# Patient Record
Sex: MALE | Race: WHITE | Employment: OTHER | URBAN - METROPOLITAN AREA
[De-identification: names, ages, dates, MRNs, and addresses within clinical notes are randomized per-mention and may not be internally consistent; named-entity substitution may affect disease eponyms.]

---

## 2017-01-25 ENCOUNTER — OFFICE VISIT (OUTPATIENT)
Dept: FAMILY MEDICINE CLINIC | Age: 62
End: 2017-01-25

## 2017-01-25 VITALS
DIASTOLIC BLOOD PRESSURE: 84 MMHG | OXYGEN SATURATION: 96 % | WEIGHT: 188 LBS | HEART RATE: 65 BPM | SYSTOLIC BLOOD PRESSURE: 136 MMHG | BODY MASS INDEX: 25.46 KG/M2

## 2017-01-25 DIAGNOSIS — E78.5 HYPERLIPIDEMIA, UNSPECIFIED HYPERLIPIDEMIA TYPE: ICD-10-CM

## 2017-01-25 DIAGNOSIS — G56.02 CARPAL TUNNEL SYNDROME OF LEFT WRIST: Primary | ICD-10-CM

## 2017-01-25 DIAGNOSIS — I10 ESSENTIAL HYPERTENSION: ICD-10-CM

## 2017-01-25 PROCEDURE — 99214 OFFICE O/P EST MOD 30 MIN: CPT | Performed by: FAMILY MEDICINE

## 2017-01-25 RX ORDER — ATORVASTATIN CALCIUM 20 MG/1
20 TABLET, FILM COATED ORAL DAILY
COMMUNITY
Start: 2016-11-21 | End: 2022-06-22

## 2017-01-25 RX ORDER — LISINOPRIL 5 MG/1
5 TABLET ORAL DAILY
COMMUNITY
Start: 2017-01-09 | End: 2020-08-11

## 2017-02-15 ENCOUNTER — OFFICE VISIT (OUTPATIENT)
Dept: ORTHOPEDIC SURGERY | Age: 62
End: 2017-02-15

## 2017-02-15 VITALS — WEIGHT: 185 LBS | HEIGHT: 71 IN | BODY MASS INDEX: 25.9 KG/M2

## 2017-02-15 DIAGNOSIS — G56.02 CARPAL TUNNEL SYNDROME, LEFT: ICD-10-CM

## 2017-02-15 DIAGNOSIS — M25.532 WRIST PAIN, LEFT: Primary | ICD-10-CM

## 2017-02-15 PROCEDURE — 73110 X-RAY EXAM OF WRIST: CPT | Performed by: ORTHOPAEDIC SURGERY

## 2017-02-15 PROCEDURE — 99213 OFFICE O/P EST LOW 20 MIN: CPT | Performed by: ORTHOPAEDIC SURGERY

## 2017-03-27 ENCOUNTER — OFFICE VISIT (OUTPATIENT)
Dept: FAMILY MEDICINE CLINIC | Age: 62
End: 2017-03-27

## 2017-03-27 VITALS
WEIGHT: 174 LBS | SYSTOLIC BLOOD PRESSURE: 138 MMHG | DIASTOLIC BLOOD PRESSURE: 84 MMHG | TEMPERATURE: 98.8 F | HEART RATE: 68 BPM | BODY MASS INDEX: 24.27 KG/M2 | OXYGEN SATURATION: 95 %

## 2017-03-27 DIAGNOSIS — J40 BRONCHITIS: Primary | ICD-10-CM

## 2017-03-27 PROCEDURE — 99213 OFFICE O/P EST LOW 20 MIN: CPT | Performed by: NURSE PRACTITIONER

## 2017-03-27 RX ORDER — AMOXICILLIN AND CLAVULANATE POTASSIUM 875; 125 MG/1; MG/1
1 TABLET, FILM COATED ORAL 2 TIMES DAILY
Qty: 20 TABLET | Refills: 0 | Status: SHIPPED | OUTPATIENT
Start: 2017-03-27 | End: 2017-04-06 | Stop reason: ALTCHOICE

## 2017-03-27 ASSESSMENT — ENCOUNTER SYMPTOMS
COUGH: 1
WHEEZING: 1
DIARRHEA: 0
SHORTNESS OF BREATH: 1
VOMITING: 0
NAUSEA: 1
SPUTUM PRODUCTION: 1
SORE THROAT: 0

## 2017-04-06 ENCOUNTER — OFFICE VISIT (OUTPATIENT)
Dept: FAMILY MEDICINE CLINIC | Age: 62
End: 2017-04-06

## 2017-04-06 VITALS
OXYGEN SATURATION: 98 % | DIASTOLIC BLOOD PRESSURE: 84 MMHG | SYSTOLIC BLOOD PRESSURE: 122 MMHG | BODY MASS INDEX: 25.52 KG/M2 | HEART RATE: 58 BPM | WEIGHT: 183 LBS | RESPIRATION RATE: 16 BRPM

## 2017-04-06 DIAGNOSIS — R53.83 FATIGUE, UNSPECIFIED TYPE: ICD-10-CM

## 2017-04-06 DIAGNOSIS — R42 DIZZINESS: Primary | ICD-10-CM

## 2017-04-06 LAB
A/G RATIO: 1.4 (ref 1.1–2.2)
ALBUMIN SERPL-MCNC: 4 G/DL (ref 3.4–5)
ALP BLD-CCNC: 170 U/L (ref 40–129)
ALT SERPL-CCNC: 34 U/L (ref 10–40)
ANION GAP SERPL CALCULATED.3IONS-SCNC: 13 MMOL/L (ref 3–16)
AST SERPL-CCNC: 26 U/L (ref 15–37)
BASOPHILS ABSOLUTE: 0 K/UL (ref 0–0.2)
BASOPHILS RELATIVE PERCENT: 0.4 %
BILIRUB SERPL-MCNC: 0.3 MG/DL (ref 0–1)
BUN BLDV-MCNC: 17 MG/DL (ref 7–20)
CALCIUM SERPL-MCNC: 9 MG/DL (ref 8.3–10.6)
CHLORIDE BLD-SCNC: 103 MMOL/L (ref 99–110)
CO2: 23 MMOL/L (ref 21–32)
CREAT SERPL-MCNC: 0.9 MG/DL (ref 0.8–1.3)
EOSINOPHILS ABSOLUTE: 0.1 K/UL (ref 0–0.6)
EOSINOPHILS RELATIVE PERCENT: 1.7 %
GFR AFRICAN AMERICAN: >60
GFR NON-AFRICAN AMERICAN: >60
GLOBULIN: 2.9 G/DL
GLUCOSE BLD-MCNC: 93 MG/DL (ref 70–99)
HCT VFR BLD CALC: 41.6 % (ref 40.5–52.5)
HEMOGLOBIN: 13.8 G/DL (ref 13.5–17.5)
LYMPHOCYTES ABSOLUTE: 1.3 K/UL (ref 1–5.1)
LYMPHOCYTES RELATIVE PERCENT: 21.4 %
MCH RBC QN AUTO: 29.2 PG (ref 26–34)
MCHC RBC AUTO-ENTMCNC: 33.2 G/DL (ref 31–36)
MCV RBC AUTO: 87.9 FL (ref 80–100)
MONOCYTES ABSOLUTE: 0.6 K/UL (ref 0–1.3)
MONOCYTES RELATIVE PERCENT: 10.8 %
NEUTROPHILS ABSOLUTE: 3.9 K/UL (ref 1.7–7.7)
NEUTROPHILS RELATIVE PERCENT: 65.7 %
PDW BLD-RTO: 14.7 % (ref 12.4–15.4)
PLATELET # BLD: 308 K/UL (ref 135–450)
PMV BLD AUTO: 7.7 FL (ref 5–10.5)
POTASSIUM SERPL-SCNC: 4.6 MMOL/L (ref 3.5–5.1)
RBC # BLD: 4.73 M/UL (ref 4.2–5.9)
SODIUM BLD-SCNC: 139 MMOL/L (ref 136–145)
TOTAL PROTEIN: 6.9 G/DL (ref 6.4–8.2)
TSH REFLEX FT4: 0.91 UIU/ML (ref 0.27–4.2)
WBC # BLD: 5.9 K/UL (ref 4–11)

## 2017-04-06 PROCEDURE — 99213 OFFICE O/P EST LOW 20 MIN: CPT | Performed by: NURSE PRACTITIONER

## 2017-04-06 PROCEDURE — 36415 COLL VENOUS BLD VENIPUNCTURE: CPT | Performed by: NURSE PRACTITIONER

## 2017-04-06 ASSESSMENT — ENCOUNTER SYMPTOMS
SHORTNESS OF BREATH: 0
VOMITING: 0
NAUSEA: 0
DIARRHEA: 0

## 2017-07-31 ENCOUNTER — TELEPHONE (OUTPATIENT)
Dept: ORTHOPEDIC SURGERY | Age: 62
End: 2017-07-31

## 2017-08-14 ENCOUNTER — OFFICE VISIT (OUTPATIENT)
Dept: FAMILY MEDICINE CLINIC | Age: 62
End: 2017-08-14

## 2017-08-14 VITALS
SYSTOLIC BLOOD PRESSURE: 112 MMHG | BODY MASS INDEX: 25.76 KG/M2 | HEART RATE: 72 BPM | DIASTOLIC BLOOD PRESSURE: 80 MMHG | OXYGEN SATURATION: 97 % | WEIGHT: 184 LBS | TEMPERATURE: 98.8 F | HEIGHT: 71 IN

## 2017-08-14 DIAGNOSIS — Z01.818 PREOP EXAMINATION: Primary | ICD-10-CM

## 2017-08-14 DIAGNOSIS — Z01.818 PRE-OP TESTING: ICD-10-CM

## 2017-08-14 PROCEDURE — 99243 OFF/OP CNSLTJ NEW/EST LOW 30: CPT | Performed by: FAMILY MEDICINE

## 2017-08-14 PROCEDURE — 93000 ELECTROCARDIOGRAM COMPLETE: CPT | Performed by: FAMILY MEDICINE

## 2017-08-22 ENCOUNTER — HOSPITAL ENCOUNTER (OUTPATIENT)
Dept: SURGERY | Age: 62
Discharge: HOME | End: 2017-08-22
Attending: ORTHOPAEDIC SURGERY | Admitting: ORTHOPAEDIC SURGERY

## 2017-08-22 VITALS
OXYGEN SATURATION: 93 % | WEIGHT: 184 LBS | DIASTOLIC BLOOD PRESSURE: 89 MMHG | RESPIRATION RATE: 16 BRPM | BODY MASS INDEX: 25.76 KG/M2 | HEIGHT: 71 IN | SYSTOLIC BLOOD PRESSURE: 124 MMHG | HEART RATE: 52 BPM | TEMPERATURE: 98.2 F

## 2017-08-22 RX ORDER — OXYCODONE HYDROCHLORIDE AND ACETAMINOPHEN 5; 325 MG/1; MG/1
1 TABLET ORAL PRN
Status: ACTIVE | OUTPATIENT
Start: 2017-08-22 | End: 2017-08-22

## 2017-08-22 RX ORDER — ONDANSETRON 2 MG/ML
4 INJECTION INTRAMUSCULAR; INTRAVENOUS
Status: ACTIVE | OUTPATIENT
Start: 2017-08-22 | End: 2017-08-22

## 2017-08-22 RX ORDER — SODIUM CHLORIDE 0.9 % (FLUSH) 0.9 %
10 SYRINGE (ML) INJECTION EVERY 12 HOURS SCHEDULED
Status: DISCONTINUED | OUTPATIENT
Start: 2017-08-22 | End: 2017-08-23 | Stop reason: HOSPADM

## 2017-08-22 RX ORDER — NAPROXEN 500 MG/1
TABLET ORAL
Refills: 2 | COMMUNITY
Start: 2017-06-16 | End: 2019-01-02 | Stop reason: ALTCHOICE

## 2017-08-22 RX ORDER — HYDRALAZINE HYDROCHLORIDE 20 MG/ML
5 INJECTION INTRAMUSCULAR; INTRAVENOUS
Status: DISCONTINUED | OUTPATIENT
Start: 2017-08-22 | End: 2017-08-23 | Stop reason: HOSPADM

## 2017-08-22 RX ORDER — LIDOCAINE HYDROCHLORIDE 10 MG/ML
1 INJECTION, SOLUTION EPIDURAL; INFILTRATION; INTRACAUDAL; PERINEURAL
Status: ACTIVE | OUTPATIENT
Start: 2017-08-22 | End: 2017-08-22

## 2017-08-22 RX ORDER — DIPHENHYDRAMINE HYDROCHLORIDE 50 MG/ML
12.5 INJECTION INTRAMUSCULAR; INTRAVENOUS
Status: ACTIVE | OUTPATIENT
Start: 2017-08-22 | End: 2017-08-22

## 2017-08-22 RX ORDER — MORPHINE SULFATE 2 MG/ML
2 INJECTION, SOLUTION INTRAMUSCULAR; INTRAVENOUS EVERY 5 MIN PRN
Status: DISCONTINUED | OUTPATIENT
Start: 2017-08-22 | End: 2017-08-23 | Stop reason: HOSPADM

## 2017-08-22 RX ORDER — LABETALOL HYDROCHLORIDE 5 MG/ML
5 INJECTION, SOLUTION INTRAVENOUS EVERY 10 MIN PRN
Status: DISCONTINUED | OUTPATIENT
Start: 2017-08-22 | End: 2017-08-23 | Stop reason: HOSPADM

## 2017-08-22 RX ORDER — SODIUM CHLORIDE 0.9 % (FLUSH) 0.9 %
10 SYRINGE (ML) INJECTION PRN
Status: DISCONTINUED | OUTPATIENT
Start: 2017-08-22 | End: 2017-08-23 | Stop reason: HOSPADM

## 2017-08-22 RX ORDER — SODIUM CHLORIDE, SODIUM LACTATE, POTASSIUM CHLORIDE, CALCIUM CHLORIDE 600; 310; 30; 20 MG/100ML; MG/100ML; MG/100ML; MG/100ML
INJECTION, SOLUTION INTRAVENOUS CONTINUOUS
Status: DISCONTINUED | OUTPATIENT
Start: 2017-08-22 | End: 2017-08-23 | Stop reason: HOSPADM

## 2017-08-22 RX ORDER — OXYCODONE HYDROCHLORIDE AND ACETAMINOPHEN 5; 325 MG/1; MG/1
2 TABLET ORAL PRN
Status: ACTIVE | OUTPATIENT
Start: 2017-08-22 | End: 2017-08-22

## 2017-08-22 RX ORDER — MEPERIDINE HYDROCHLORIDE 50 MG/ML
12.5 INJECTION INTRAMUSCULAR; INTRAVENOUS; SUBCUTANEOUS EVERY 5 MIN PRN
Status: DISCONTINUED | OUTPATIENT
Start: 2017-08-22 | End: 2017-08-23 | Stop reason: HOSPADM

## 2017-08-22 RX ORDER — MORPHINE SULFATE 2 MG/ML
1 INJECTION, SOLUTION INTRAMUSCULAR; INTRAVENOUS EVERY 5 MIN PRN
Status: DISCONTINUED | OUTPATIENT
Start: 2017-08-22 | End: 2017-08-23 | Stop reason: HOSPADM

## 2017-08-22 RX ADMIN — SODIUM CHLORIDE, SODIUM LACTATE, POTASSIUM CHLORIDE, CALCIUM CHLORIDE: 600; 310; 30; 20 INJECTION, SOLUTION INTRAVENOUS at 06:32

## 2017-08-22 ASSESSMENT — PAIN - FUNCTIONAL ASSESSMENT: PAIN_FUNCTIONAL_ASSESSMENT: 0-10

## 2017-08-30 ENCOUNTER — OFFICE VISIT (OUTPATIENT)
Dept: ORTHOPEDIC SURGERY | Age: 62
End: 2017-08-30

## 2017-08-30 DIAGNOSIS — G56.02 CARPAL TUNNEL SYNDROME, LEFT: Primary | ICD-10-CM

## 2017-08-30 PROCEDURE — 99024 POSTOP FOLLOW-UP VISIT: CPT | Performed by: ORTHOPAEDIC SURGERY

## 2018-04-11 RX ORDER — LISINOPRIL 5 MG/1
TABLET ORAL
Qty: 30 TABLET | Refills: 2 | Status: SHIPPED | OUTPATIENT
Start: 2018-04-11 | End: 2018-07-18 | Stop reason: SDUPTHER

## 2018-07-11 ENCOUNTER — OFFICE VISIT (OUTPATIENT)
Dept: ORTHOPEDIC SURGERY | Age: 63
End: 2018-07-11

## 2018-07-11 VITALS
HEIGHT: 72 IN | DIASTOLIC BLOOD PRESSURE: 76 MMHG | HEART RATE: 64 BPM | BODY MASS INDEX: 25.73 KG/M2 | SYSTOLIC BLOOD PRESSURE: 119 MMHG | WEIGHT: 190 LBS

## 2018-07-11 DIAGNOSIS — M22.42 CHONDROMALACIA PATELLAE OF LEFT KNEE: ICD-10-CM

## 2018-07-11 DIAGNOSIS — M17.12 PRIMARY OSTEOARTHRITIS OF LEFT KNEE: Primary | ICD-10-CM

## 2018-07-11 DIAGNOSIS — M25.562 LEFT KNEE PAIN, UNSPECIFIED CHRONICITY: ICD-10-CM

## 2018-07-11 PROCEDURE — 99243 OFF/OP CNSLTJ NEW/EST LOW 30: CPT | Performed by: FAMILY MEDICINE

## 2018-07-11 PROCEDURE — 20610 DRAIN/INJ JOINT/BURSA W/O US: CPT | Performed by: FAMILY MEDICINE

## 2018-07-11 RX ORDER — DICLOFENAC SODIUM 75 MG/1
75 TABLET, DELAYED RELEASE ORAL 2 TIMES DAILY
Qty: 60 TABLET | Refills: 3 | Status: SHIPPED | OUTPATIENT
Start: 2018-07-11 | End: 2019-01-02 | Stop reason: ALTCHOICE

## 2018-07-11 NOTE — PROGRESS NOTES
Chief Complaint  Knee Pain (opnp left knee pain )      Initial consultation left anterior medial knee pain    History of Present Illness:  Rigoberto Deluna is a 65 y.o. male who is a very pleasant white male  for [employer] as very nice patient of Dr. Bri Rooney who is being seen today in Consultation from Dr. Daren Harley for Evaluation of Persistent Worsening Pain to His Left Knee. He states that he has had on-and-off pain to his knees bilaterally for the past 10-12 years which she always assumed was related to arthritis but states that on about 7/3/2018, he began a worsening pain once again to the anterior medial portion of his left knee. There is no history of injury or new activity prior to becoming symptomatic although with his job as a  he does have frequent climbing up and down stairs and ladders. He does state that the pain is achy in nature rates between a 5-7 out of 10 but his major complaint is that he is having difficulty getting to sleep at night which has not responded to oral anti-inflammatory medications and icing. He has had some pseudo-buckling but denies active locking or catching. He has not had recent weightbearing images of his knees. He has continued to work. Once again the locking catching back pain groin pain or radicular symptoms. He is being seen today for orthopedic and sports consultation with imaging. Medical History     Patient's medications, allergies, past medical, surgical, social and family histories were reviewed and updated as appropriate. Review of Systems  Pertinent items are noted in HPI  Review of systems reviewed from Patient History Form dated on 7/11/2018 and available in the patient's chart under the Media tab. Vital Signs  Vitals:    07/11/18 1302   BP: 119/76   Pulse: 64       General Exam:     Constitutional: Patient is adequately groomed with no evidence of malnutrition  DTRs: Deep tendon reflexes are intact  Mental Status:  The any tenderness, deformity or injury. Range of motion is unremarkable. There is no gross instability. There are no rashes, ulcerations or lesions. Strength and tone are normal.      Diagnostic Test Findings: Left knee AP and PA weight-bearing sunrise and lateral films were obtained today and shows at least moderate compartment narrowing with medial spurring. There is some evidence of patellofemoral arthropathy as well. He does appear to have some contralateral right knee medial compartment narrowing as well but this is relatively asymptomatic. Assessment :  #1. One-week status post persistent symptomatic left knee osteoarthritis patellofemoral arthropathy and synovitis. Impression:  Encounter Diagnosis   Name Primary?  Left knee pain, unspecified chronicity Yes       Office Procedures:  Orders Placed This Encounter   Procedures    XR KNEE LEFT (MIN 4 VIEWS)       Treatment Plan:  Treatment options were discussed with Dheeraj Horton III. We did review his plain films and exam findings. He does have at least moderate medial compartment osteoarthritis with patellofemoral arthropathy. The potential for occult degenerative medial meniscus tear was discussed or reason little weak treatment. He is fortunately not complaining of mechanical symptoms. We will try initial conservative treatment and we did inject his knee today using 2 mL of Celestone, 2 of Marcaine, 1 of Xylocaine. He was placed in a patellar stabilizing brace to utilize primarily at work and when he is walking distances and climbing stairs. We placed him on diclofenac 75 mg 1 pill twice daily. Potential side effects. We'll start him in supervised physical therapy. Icing and activity modification was discussed. We'll see him back in 3-4 weeks for follow-up and consider imaging if he is failing to improve.     Cc: Dr. Onur Rainey      This dictation was performed with a verbal recognition program [facility] ) and it was checked for errors. It is possible that there are still dictated errors within this office note. If so, please bring any errors to my attention for an addendum. All efforts were made to ensure that this office note is accurate.

## 2018-07-18 ENCOUNTER — HOSPITAL ENCOUNTER (OUTPATIENT)
Dept: PHYSICAL THERAPY | Age: 63
Setting detail: THERAPIES SERIES
Discharge: HOME | End: 2018-07-18
Payer: COMMERCIAL

## 2018-07-18 PROCEDURE — G8979 MOBILITY GOAL STATUS: HCPCS | Performed by: PHYSICAL THERAPIST

## 2018-07-18 PROCEDURE — 97161 PT EVAL LOW COMPLEX 20 MIN: CPT | Performed by: PHYSICAL THERAPIST

## 2018-07-18 PROCEDURE — 97110 THERAPEUTIC EXERCISES: CPT | Performed by: PHYSICAL THERAPIST

## 2018-07-18 PROCEDURE — G8978 MOBILITY CURRENT STATUS: HCPCS | Performed by: PHYSICAL THERAPIST

## 2018-07-18 RX ORDER — LISINOPRIL 5 MG/1
TABLET ORAL
Qty: 30 TABLET | Refills: 1 | Status: SHIPPED | OUTPATIENT
Start: 2018-07-18 | End: 2019-01-02 | Stop reason: SDUPTHER

## 2018-07-18 NOTE — PLAN OF CARE
regarding ROS issues if not already being addressed at this time. Co-morbidities/Complexities (which will affect course of rehabilitation):   []None           Arthritic conditions   []Rheumatoid arthritis (M05.9)  [x]Osteoarthritis (M19.91)   Cardiovascular conditions   [x]Hypertension (I10)  []Hyperlipidemia (E78.5)  []Angina pectoris (I20)  []Atherosclerosis (I70)   Musculoskeletal conditions   []Disc pathology   []Congenital spine pathologies   []Prior surgical intervention  []Osteoporosis (M81.8)  []Osteopenia (M85.8)   Endocrine conditions   []Hypothyroid (E03.9)  []Hyperthyroid Gastrointestinal conditions   []Constipation (X79.27)   Metabolic conditions   []Morbid obesity (E66.01)  []Diabetes type 1(E10.65) or 2 (E11.65)   []Neuropathy (G60.9)     Pulmonary conditions   []Asthma (J45)  []Coughing   []COPD (J44.9)   Psychological Disorders  []Anxiety (F41.9)  []Depression (F32.9)   []Other:   [x]Other:  History of heart attack  Stent  2004        Barriers to/and or personal factors that will affect rehab potential:              []Age  []Sex              []Motivation/Lack of Motivation                        []Co-Morbidities              []Cognitive Function, education/learning barriers              []Environmental, home barriers              [x]profession/work barriers  []past PT/medical experience  []other:  Justification: Pt has high demand job that requires positioning that can be stressful on the patello femoral joint    Falls Risk Assessment (30 days):   [x] Falls Risk assessed and no intervention required. [] Falls Risk assessed and Patient requires intervention due to being higher risk   TUG score (>12s at risk):     [] Falls education provided, including       G-Codes:  PT G-Codes  Functional Assessment Tool Used: LEFS  Score: 70  Functional Limitation: Mobility: Walking and moving around  Mobility: Walking and Moving Around Current Status ():  At least 60 percent but less than 80 percent

## 2019-01-02 ENCOUNTER — OFFICE VISIT (OUTPATIENT)
Dept: FAMILY MEDICINE CLINIC | Age: 64
End: 2019-01-02
Payer: COMMERCIAL

## 2019-01-02 VITALS
OXYGEN SATURATION: 96 % | SYSTOLIC BLOOD PRESSURE: 130 MMHG | HEART RATE: 98 BPM | DIASTOLIC BLOOD PRESSURE: 86 MMHG | WEIGHT: 191 LBS | BODY MASS INDEX: 25.9 KG/M2

## 2019-01-02 DIAGNOSIS — K21.9 GASTROESOPHAGEAL REFLUX DISEASE WITHOUT ESOPHAGITIS: Primary | ICD-10-CM

## 2019-01-02 PROCEDURE — 99214 OFFICE O/P EST MOD 30 MIN: CPT | Performed by: NURSE PRACTITIONER

## 2019-01-02 RX ORDER — ESOMEPRAZOLE MAGNESIUM 40 MG/1
40 CAPSULE, DELAYED RELEASE ORAL DAILY
Qty: 30 CAPSULE | Refills: 3 | Status: SHIPPED | OUTPATIENT
Start: 2019-01-02 | End: 2019-02-25

## 2019-01-02 ASSESSMENT — ENCOUNTER SYMPTOMS
SORE THROAT: 0
CHEST TIGHTNESS: 0
HEARTBURN: 1
TROUBLE SWALLOWING: 0
EYE DISCHARGE: 0
DIARRHEA: 0
NAUSEA: 0
CONSTIPATION: 0
CHOKING: 0
SINUS PAIN: 0
HOARSE VOICE: 0
BELCHING: 1
VOMITING: 0
SHORTNESS OF BREATH: 0
SINUS PRESSURE: 0
BACK PAIN: 0
ABDOMINAL PAIN: 1
COUGH: 0
COLOR CHANGE: 0

## 2019-01-14 ENCOUNTER — HOSPITAL ENCOUNTER (OUTPATIENT)
Age: 64
Discharge: HOME | End: 2019-01-14
Payer: COMMERCIAL

## 2019-01-14 ENCOUNTER — INITIAL CONSULT (OUTPATIENT)
Dept: GASTROENTEROLOGY | Age: 64
End: 2019-01-14
Payer: COMMERCIAL

## 2019-01-14 VITALS
HEIGHT: 72 IN | WEIGHT: 194.4 LBS | SYSTOLIC BLOOD PRESSURE: 106 MMHG | DIASTOLIC BLOOD PRESSURE: 62 MMHG | BODY MASS INDEX: 26.33 KG/M2

## 2019-01-14 DIAGNOSIS — R10.13 EPIGASTRIC DISCOMFORT: ICD-10-CM

## 2019-01-14 DIAGNOSIS — R74.8 ALKALINE PHOSPHATASE ELEVATION: ICD-10-CM

## 2019-01-14 DIAGNOSIS — R10.13 EPIGASTRIC DISCOMFORT: Primary | ICD-10-CM

## 2019-01-14 PROCEDURE — 84080 ASSAY ALKALINE PHOSPHATASES: CPT

## 2019-01-14 PROCEDURE — 99204 OFFICE O/P NEW MOD 45 MIN: CPT | Performed by: INTERNAL MEDICINE

## 2019-01-14 PROCEDURE — 36415 COLL VENOUS BLD VENIPUNCTURE: CPT

## 2019-01-14 PROCEDURE — 84075 ASSAY ALKALINE PHOSPHATASE: CPT

## 2019-01-14 PROCEDURE — 82977 ASSAY OF GGT: CPT

## 2019-01-15 LAB — GAMMA GLUTAMYL TRANSFERASE: 22 U/L (ref 8–61)

## 2019-01-16 ENCOUNTER — TELEPHONE (OUTPATIENT)
Dept: GASTROENTEROLOGY | Age: 64
End: 2019-01-16

## 2019-01-17 ENCOUNTER — TELEPHONE (OUTPATIENT)
Dept: GASTROENTEROLOGY | Age: 64
End: 2019-01-17

## 2019-01-17 ENCOUNTER — HOSPITAL ENCOUNTER (OUTPATIENT)
Age: 64
Setting detail: OUTPATIENT SURGERY
Discharge: HOME | End: 2019-01-17
Attending: INTERNAL MEDICINE | Admitting: INTERNAL MEDICINE
Payer: COMMERCIAL

## 2019-01-17 VITALS
TEMPERATURE: 97 F | OXYGEN SATURATION: 93 % | HEIGHT: 72 IN | SYSTOLIC BLOOD PRESSURE: 130 MMHG | DIASTOLIC BLOOD PRESSURE: 91 MMHG | WEIGHT: 190 LBS | BODY MASS INDEX: 25.73 KG/M2 | RESPIRATION RATE: 16 BRPM | HEART RATE: 54 BPM

## 2019-01-17 LAB
ALK PHOS OTHER CALC: 0 U/L
ALK PHOSPHATASE: 138 U/L (ref 40–120)
ALKALINE PHOSPHATASE BONE FRACTION: 54 U/L (ref 0–55)
ALKALINE PHOSPHATASE LIVER FRACTION: 84 U/L (ref 0–94)

## 2019-01-17 PROCEDURE — 7100000010 HC PHASE II RECOVERY - FIRST 15 MIN: Performed by: INTERNAL MEDICINE

## 2019-01-17 PROCEDURE — 3609012400 HC EGD TRANSORAL BIOPSY SINGLE/MULTIPLE: Performed by: INTERNAL MEDICINE

## 2019-01-17 PROCEDURE — 6360000002 HC RX W HCPCS: Performed by: INTERNAL MEDICINE

## 2019-01-17 PROCEDURE — 99152 MOD SED SAME PHYS/QHP 5/>YRS: CPT | Performed by: INTERNAL MEDICINE

## 2019-01-17 PROCEDURE — 3609015300 HC ESOPHAGEAL DILATION MALONEY: Performed by: INTERNAL MEDICINE

## 2019-01-17 PROCEDURE — 7100000011 HC PHASE II RECOVERY - ADDTL 15 MIN: Performed by: INTERNAL MEDICINE

## 2019-01-17 PROCEDURE — 43450 DILATE ESOPHAGUS 1/MULT PASS: CPT | Performed by: INTERNAL MEDICINE

## 2019-01-17 PROCEDURE — 88305 TISSUE EXAM BY PATHOLOGIST: CPT

## 2019-01-17 PROCEDURE — 2709999900 HC NON-CHARGEABLE SUPPLY: Performed by: INTERNAL MEDICINE

## 2019-01-17 PROCEDURE — 43239 EGD BIOPSY SINGLE/MULTIPLE: CPT | Performed by: INTERNAL MEDICINE

## 2019-01-17 PROCEDURE — 88342 IMHCHEM/IMCYTCHM 1ST ANTB: CPT

## 2019-01-17 RX ORDER — MIDAZOLAM HYDROCHLORIDE 5 MG/ML
INJECTION INTRAMUSCULAR; INTRAVENOUS PRN
Status: DISCONTINUED | OUTPATIENT
Start: 2019-01-17 | End: 2019-01-17 | Stop reason: HOSPADM

## 2019-01-17 RX ORDER — FENTANYL CITRATE 50 UG/ML
INJECTION, SOLUTION INTRAMUSCULAR; INTRAVENOUS PRN
Status: DISCONTINUED | OUTPATIENT
Start: 2019-01-17 | End: 2019-01-17 | Stop reason: HOSPADM

## 2019-01-17 RX ORDER — SODIUM CHLORIDE, SODIUM LACTATE, POTASSIUM CHLORIDE, CALCIUM CHLORIDE 600; 310; 30; 20 MG/100ML; MG/100ML; MG/100ML; MG/100ML
INJECTION, SOLUTION INTRAVENOUS CONTINUOUS
Status: DISCONTINUED | OUTPATIENT
Start: 2019-01-17 | End: 2019-01-17 | Stop reason: HOSPADM

## 2019-01-17 ASSESSMENT — PAIN - FUNCTIONAL ASSESSMENT: PAIN_FUNCTIONAL_ASSESSMENT: 0-10

## 2019-01-18 ENCOUNTER — TELEPHONE (OUTPATIENT)
Dept: GASTROENTEROLOGY | Age: 64
End: 2019-01-18

## 2019-01-21 DIAGNOSIS — A04.8 H. PYLORI INFECTION: Primary | ICD-10-CM

## 2019-01-21 RX ORDER — TETRACYCLINE HYDROCHLORIDE 500 MG/1
500 CAPSULE ORAL 4 TIMES DAILY
Qty: 56 CAPSULE | Refills: 0 | Status: SHIPPED | OUTPATIENT
Start: 2019-01-21 | End: 2019-02-04

## 2019-01-21 RX ORDER — METRONIDAZOLE 500 MG/1
500 TABLET ORAL 4 TIMES DAILY
Qty: 56 TABLET | Refills: 0 | Status: SHIPPED | OUTPATIENT
Start: 2019-01-21 | End: 2019-02-04

## 2019-01-21 RX ORDER — NICOTINE POLACRILEX 4 MG/1
20 GUM, CHEWING ORAL 2 TIMES DAILY
Qty: 28 TABLET | Refills: 0 | Status: SHIPPED | OUTPATIENT
Start: 2019-01-21 | End: 2019-08-06

## 2019-01-24 ENCOUNTER — PATIENT MESSAGE (OUTPATIENT)
Dept: GASTROENTEROLOGY | Age: 64
End: 2019-01-24

## 2019-02-25 ENCOUNTER — OFFICE VISIT (OUTPATIENT)
Dept: GASTROENTEROLOGY | Age: 64
End: 2019-02-25
Payer: COMMERCIAL

## 2019-02-25 VITALS
OXYGEN SATURATION: 95 % | DIASTOLIC BLOOD PRESSURE: 64 MMHG | WEIGHT: 188 LBS | HEIGHT: 72 IN | HEART RATE: 67 BPM | SYSTOLIC BLOOD PRESSURE: 102 MMHG | BODY MASS INDEX: 25.47 KG/M2

## 2019-02-25 DIAGNOSIS — K29.70 HELICOBACTER PYLORI GASTRITIS: Primary | ICD-10-CM

## 2019-02-25 DIAGNOSIS — B96.81 HELICOBACTER PYLORI GASTRITIS: Primary | ICD-10-CM

## 2019-02-25 PROCEDURE — 99213 OFFICE O/P EST LOW 20 MIN: CPT | Performed by: INTERNAL MEDICINE

## 2019-03-11 ENCOUNTER — TELEPHONE (OUTPATIENT)
Dept: GASTROENTEROLOGY | Age: 64
End: 2019-03-11

## 2019-04-24 ENCOUNTER — TELEPHONE (OUTPATIENT)
Dept: GASTROENTEROLOGY | Age: 64
End: 2019-04-24

## 2019-07-10 ENCOUNTER — OFFICE VISIT (OUTPATIENT)
Dept: FAMILY MEDICINE CLINIC | Age: 64
End: 2019-07-10
Payer: COMMERCIAL

## 2019-07-10 VITALS
BODY MASS INDEX: 25.5 KG/M2 | HEART RATE: 102 BPM | WEIGHT: 188 LBS | SYSTOLIC BLOOD PRESSURE: 132 MMHG | OXYGEN SATURATION: 93 % | DIASTOLIC BLOOD PRESSURE: 70 MMHG

## 2019-07-10 DIAGNOSIS — K40.90 LEFT INGUINAL HERNIA: Primary | ICD-10-CM

## 2019-07-10 PROCEDURE — 99213 OFFICE O/P EST LOW 20 MIN: CPT | Performed by: FAMILY MEDICINE

## 2019-07-10 ASSESSMENT — PATIENT HEALTH QUESTIONNAIRE - PHQ9
SUM OF ALL RESPONSES TO PHQ QUESTIONS 1-9: 0
2. FEELING DOWN, DEPRESSED OR HOPELESS: 0
SUM OF ALL RESPONSES TO PHQ9 QUESTIONS 1 & 2: 0
SUM OF ALL RESPONSES TO PHQ QUESTIONS 1-9: 0
1. LITTLE INTEREST OR PLEASURE IN DOING THINGS: 0

## 2019-07-16 ENCOUNTER — INITIAL CONSULT (OUTPATIENT)
Dept: SURGERY | Age: 64
End: 2019-07-16
Payer: COMMERCIAL

## 2019-07-16 VITALS
SYSTOLIC BLOOD PRESSURE: 124 MMHG | HEIGHT: 72 IN | DIASTOLIC BLOOD PRESSURE: 76 MMHG | WEIGHT: 184 LBS | BODY MASS INDEX: 24.92 KG/M2

## 2019-07-16 DIAGNOSIS — K40.90 LEFT INGUINAL HERNIA: Primary | ICD-10-CM

## 2019-07-16 PROCEDURE — 99242 OFF/OP CONSLTJ NEW/EST SF 20: CPT | Performed by: SURGERY

## 2019-07-16 RX ORDER — SODIUM CHLORIDE 0.9 % (FLUSH) 0.9 %
10 SYRINGE (ML) INJECTION EVERY 12 HOURS SCHEDULED
Status: CANCELLED | OUTPATIENT
Start: 2019-07-16

## 2019-07-16 RX ORDER — SODIUM CHLORIDE 0.9 % (FLUSH) 0.9 %
10 SYRINGE (ML) INJECTION PRN
Status: CANCELLED | OUTPATIENT
Start: 2019-07-16

## 2019-07-16 NOTE — PROGRESS NOTES
ENDOSCOPY    VASECTOMY      WISDOM TOOTH EXTRACTION       Family History   Problem Relation Age of Onset    Heart Disease Father     Heart Disease Paternal Grandfather     Cancer Neg Hx      Social History     Socioeconomic History    Marital status:      Spouse name: Not on file    Number of children: Not on file    Years of education: Not on file    Highest education level: Not on file   Occupational History    Not on file   Social Needs    Financial resource strain: Not on file    Food insecurity:     Worry: Not on file     Inability: Not on file    Transportation needs:     Medical: Not on file     Non-medical: Not on file   Tobacco Use    Smoking status: Never Smoker    Smokeless tobacco: Never Used   Substance and Sexual Activity    Alcohol use: Yes     Alcohol/week: 2.0 standard drinks     Types: 2 Cans of beer per week    Drug use: No    Sexual activity: Yes     Partners: Female   Lifestyle    Physical activity:     Days per week: Not on file     Minutes per session: Not on file    Stress: Not on file   Relationships    Social connections:     Talks on phone: Not on file     Gets together: Not on file     Attends Hindu service: Not on file     Active member of club or organization: Not on file     Attends meetings of clubs or organizations: Not on file     Relationship status: Not on file    Intimate partner violence:     Fear of current or ex partner: Not on file     Emotionally abused: Not on file     Physically abused: Not on file     Forced sexual activity: Not on file   Other Topics Concern    Not on file   Social History Narrative    Not on file          Vitals:    07/16/19 0935   BP: 124/76   Site: Left Upper Arm   Position: Sitting   Cuff Size: Large Adult   Weight: 184 lb (83.5 kg)   Height: 6' 0.01\" (1.829 m)     Body mass index is 24.95 kg/m².      Wt Readings from Last 3 Encounters:   07/16/19 184 lb (83.5 kg)   07/10/19 188 lb (85.3 kg)   02/25/19 188 lb (85.3

## 2019-08-09 ENCOUNTER — ANESTHESIA EVENT (OUTPATIENT)
Dept: OPERATING ROOM | Age: 64
End: 2019-08-09
Payer: COMMERCIAL

## 2019-08-09 NOTE — ANESTHESIA PRE PROCEDURE
reviewed      Echocardiogram reviewed               ROS comment: S/P NSTEMI-> Stent     EK-AUG-2019 Sinus bradycardia 52; 1st degree AV Block; Minimal voltage criteria for LVH, may be normal variant; Inferior infarct (cited on or before 12-AUG-2019)    ECHO:  Left ventricle: The cavity size was dilated. Wall thickness was  normal. Systolic function was normal. The calculated ejection  fraction was in the range of 50% to 55%. Wall motion was normal;  there were no regional wall motion abnormalities. Neuro/Psych:      (-) seizures, TIA and CVA           GI/Hepatic/Renal:        (-) GERD and no renal disease       Endo/Other:        (-) diabetes mellitus, hypothyroidism, arthritis               Abdominal:           Vascular:     - DVT and PE. Anesthesia Plan      general     ASA 3       Induction: intravenous. MIPS: Prophylactic antiemetics administered. Anesthetic plan and risks discussed with patient. Plan discussed with CRNA.             Jessica Mcneill MD

## 2019-08-12 ENCOUNTER — HOSPITAL ENCOUNTER (OUTPATIENT)
Age: 64
Setting detail: OUTPATIENT SURGERY
Discharge: HOME | End: 2019-08-12
Attending: SURGERY | Admitting: SURGERY
Payer: COMMERCIAL

## 2019-08-12 ENCOUNTER — ANESTHESIA (OUTPATIENT)
Dept: OPERATING ROOM | Age: 64
End: 2019-08-12
Payer: COMMERCIAL

## 2019-08-12 VITALS
HEART RATE: 52 BPM | WEIGHT: 184 LBS | RESPIRATION RATE: 11 BRPM | BODY MASS INDEX: 24.92 KG/M2 | TEMPERATURE: 97.8 F | SYSTOLIC BLOOD PRESSURE: 147 MMHG | OXYGEN SATURATION: 94 % | HEIGHT: 72 IN | DIASTOLIC BLOOD PRESSURE: 94 MMHG

## 2019-08-12 VITALS
SYSTOLIC BLOOD PRESSURE: 121 MMHG | TEMPERATURE: 95.3 F | DIASTOLIC BLOOD PRESSURE: 82 MMHG | RESPIRATION RATE: 1 BRPM | OXYGEN SATURATION: 98 %

## 2019-08-12 DIAGNOSIS — K40.90 LEFT INGUINAL HERNIA: Primary | ICD-10-CM

## 2019-08-12 LAB
ANION GAP SERPL CALCULATED.3IONS-SCNC: 10 MMOL/L (ref 3–16)
BUN BLDV-MCNC: 16 MG/DL (ref 7–20)
CALCIUM SERPL-MCNC: 9.1 MG/DL (ref 8.3–10.6)
CHLORIDE BLD-SCNC: 106 MMOL/L (ref 99–110)
CO2: 24 MMOL/L (ref 21–32)
CREAT SERPL-MCNC: 0.8 MG/DL (ref 0.8–1.3)
GFR AFRICAN AMERICAN: >60
GFR NON-AFRICAN AMERICAN: >60
GLUCOSE BLD-MCNC: 96 MG/DL (ref 70–99)
POTASSIUM SERPL-SCNC: 4.2 MMOL/L (ref 3.5–5.1)
SODIUM BLD-SCNC: 140 MMOL/L (ref 136–145)

## 2019-08-12 PROCEDURE — S2900 ROBOTIC SURGICAL SYSTEM: HCPCS | Performed by: SURGERY

## 2019-08-12 PROCEDURE — 2500000003 HC RX 250 WO HCPCS: Performed by: ANESTHESIOLOGY

## 2019-08-12 PROCEDURE — C1781 MESH (IMPLANTABLE): HCPCS | Performed by: SURGERY

## 2019-08-12 PROCEDURE — 6360000002 HC RX W HCPCS: Performed by: SURGERY

## 2019-08-12 PROCEDURE — 7100000010 HC PHASE II RECOVERY - FIRST 15 MIN: Performed by: SURGERY

## 2019-08-12 PROCEDURE — 93005 ELECTROCARDIOGRAM TRACING: CPT | Performed by: ANESTHESIOLOGY

## 2019-08-12 PROCEDURE — 3700000000 HC ANESTHESIA ATTENDED CARE: Performed by: SURGERY

## 2019-08-12 PROCEDURE — 2580000003 HC RX 258: Performed by: ANESTHESIOLOGY

## 2019-08-12 PROCEDURE — 80048 BASIC METABOLIC PNL TOTAL CA: CPT

## 2019-08-12 PROCEDURE — 6370000000 HC RX 637 (ALT 250 FOR IP): Performed by: ANESTHESIOLOGY

## 2019-08-12 PROCEDURE — 2709999900 HC NON-CHARGEABLE SUPPLY: Performed by: SURGERY

## 2019-08-12 PROCEDURE — 6360000002 HC RX W HCPCS: Performed by: ANESTHESIOLOGY

## 2019-08-12 PROCEDURE — 2500000003 HC RX 250 WO HCPCS: Performed by: NURSE ANESTHETIST, CERTIFIED REGISTERED

## 2019-08-12 PROCEDURE — 3600000019 HC SURGERY ROBOT ADDTL 15MIN: Performed by: SURGERY

## 2019-08-12 PROCEDURE — 7100000011 HC PHASE II RECOVERY - ADDTL 15 MIN: Performed by: SURGERY

## 2019-08-12 PROCEDURE — 3700000001 HC ADD 15 MINUTES (ANESTHESIA): Performed by: SURGERY

## 2019-08-12 PROCEDURE — 7100000001 HC PACU RECOVERY - ADDTL 15 MIN: Performed by: SURGERY

## 2019-08-12 PROCEDURE — 3600000009 HC SURGERY ROBOT BASE: Performed by: SURGERY

## 2019-08-12 PROCEDURE — 7100000000 HC PACU RECOVERY - FIRST 15 MIN: Performed by: SURGERY

## 2019-08-12 PROCEDURE — 2500000003 HC RX 250 WO HCPCS: Performed by: SURGERY

## 2019-08-12 PROCEDURE — 49650 LAP ING HERNIA REPAIR INIT: CPT | Performed by: SURGERY

## 2019-08-12 PROCEDURE — 6360000002 HC RX W HCPCS: Performed by: NURSE ANESTHETIST, CERTIFIED REGISTERED

## 2019-08-12 DEVICE — MESH HERN L W10.8XL16CM L INGUINAL WHT POLYPR MFIL: Type: IMPLANTABLE DEVICE | Site: INGUINAL | Status: FUNCTIONAL

## 2019-08-12 RX ORDER — MIDAZOLAM HYDROCHLORIDE 1 MG/ML
INJECTION INTRAMUSCULAR; INTRAVENOUS PRN
Status: DISCONTINUED | OUTPATIENT
Start: 2019-08-12 | End: 2019-08-12 | Stop reason: SDUPTHER

## 2019-08-12 RX ORDER — HYDRALAZINE HYDROCHLORIDE 20 MG/ML
5 INJECTION INTRAMUSCULAR; INTRAVENOUS EVERY 10 MIN PRN
Status: DISCONTINUED | OUTPATIENT
Start: 2019-08-12 | End: 2019-08-12 | Stop reason: HOSPADM

## 2019-08-12 RX ORDER — ONDANSETRON 2 MG/ML
INJECTION INTRAMUSCULAR; INTRAVENOUS PRN
Status: DISCONTINUED | OUTPATIENT
Start: 2019-08-12 | End: 2019-08-12 | Stop reason: SDUPTHER

## 2019-08-12 RX ORDER — LIDOCAINE HYDROCHLORIDE 10 MG/ML
1 INJECTION, SOLUTION EPIDURAL; INFILTRATION; INTRACAUDAL; PERINEURAL
Status: COMPLETED | OUTPATIENT
Start: 2019-08-12 | End: 2019-08-12

## 2019-08-12 RX ORDER — FENTANYL CITRATE 50 UG/ML
INJECTION, SOLUTION INTRAMUSCULAR; INTRAVENOUS PRN
Status: DISCONTINUED | OUTPATIENT
Start: 2019-08-12 | End: 2019-08-12 | Stop reason: SDUPTHER

## 2019-08-12 RX ORDER — SODIUM CHLORIDE 0.9 % (FLUSH) 0.9 %
10 SYRINGE (ML) INJECTION PRN
Status: DISCONTINUED | OUTPATIENT
Start: 2019-08-12 | End: 2019-08-12 | Stop reason: HOSPADM

## 2019-08-12 RX ORDER — ONDANSETRON 2 MG/ML
4 INJECTION INTRAMUSCULAR; INTRAVENOUS
Status: DISCONTINUED | OUTPATIENT
Start: 2019-08-12 | End: 2019-08-12 | Stop reason: HOSPADM

## 2019-08-12 RX ORDER — PROMETHAZINE HYDROCHLORIDE 25 MG/ML
6.25 INJECTION, SOLUTION INTRAMUSCULAR; INTRAVENOUS
Status: DISCONTINUED | OUTPATIENT
Start: 2019-08-12 | End: 2019-08-12 | Stop reason: HOSPADM

## 2019-08-12 RX ORDER — LIDOCAINE HYDROCHLORIDE 20 MG/ML
INJECTION, SOLUTION INFILTRATION; PERINEURAL PRN
Status: DISCONTINUED | OUTPATIENT
Start: 2019-08-12 | End: 2019-08-12 | Stop reason: SDUPTHER

## 2019-08-12 RX ORDER — SCOLOPAMINE TRANSDERMAL SYSTEM 1 MG/1
1 PATCH, EXTENDED RELEASE TRANSDERMAL
Status: DISCONTINUED | OUTPATIENT
Start: 2019-08-12 | End: 2019-08-12 | Stop reason: HOSPADM

## 2019-08-12 RX ORDER — HEPARIN SODIUM 5000 [USP'U]/ML
INJECTION, SOLUTION INTRAVENOUS; SUBCUTANEOUS PRN
Status: DISCONTINUED | OUTPATIENT
Start: 2019-08-12 | End: 2019-08-12 | Stop reason: ALTCHOICE

## 2019-08-12 RX ORDER — OXYCODONE HYDROCHLORIDE AND ACETAMINOPHEN 5; 325 MG/1; MG/1
1 TABLET ORAL PRN
Status: COMPLETED | OUTPATIENT
Start: 2019-08-12 | End: 2019-08-12

## 2019-08-12 RX ORDER — PROPOFOL 10 MG/ML
INJECTION, EMULSION INTRAVENOUS PRN
Status: DISCONTINUED | OUTPATIENT
Start: 2019-08-12 | End: 2019-08-12 | Stop reason: SDUPTHER

## 2019-08-12 RX ORDER — MEPERIDINE HYDROCHLORIDE 50 MG/ML
12.5 INJECTION INTRAMUSCULAR; INTRAVENOUS; SUBCUTANEOUS EVERY 5 MIN PRN
Status: DISCONTINUED | OUTPATIENT
Start: 2019-08-12 | End: 2019-08-12 | Stop reason: HOSPADM

## 2019-08-12 RX ORDER — SODIUM CHLORIDE 0.9 % (FLUSH) 0.9 %
10 SYRINGE (ML) INJECTION EVERY 12 HOURS SCHEDULED
Status: DISCONTINUED | OUTPATIENT
Start: 2019-08-12 | End: 2019-08-12 | Stop reason: HOSPADM

## 2019-08-12 RX ORDER — FENTANYL CITRATE 50 UG/ML
25 INJECTION, SOLUTION INTRAMUSCULAR; INTRAVENOUS EVERY 5 MIN PRN
Status: DISCONTINUED | OUTPATIENT
Start: 2019-08-12 | End: 2019-08-12 | Stop reason: HOSPADM

## 2019-08-12 RX ORDER — OXYCODONE HYDROCHLORIDE AND ACETAMINOPHEN 5; 325 MG/1; MG/1
2 TABLET ORAL PRN
Status: COMPLETED | OUTPATIENT
Start: 2019-08-12 | End: 2019-08-12

## 2019-08-12 RX ORDER — APREPITANT 40 MG/1
40 CAPSULE ORAL ONCE
Status: COMPLETED | OUTPATIENT
Start: 2019-08-12 | End: 2019-08-12

## 2019-08-12 RX ORDER — OXYCODONE HYDROCHLORIDE AND ACETAMINOPHEN 5; 325 MG/1; MG/1
1 TABLET ORAL EVERY 6 HOURS PRN
Qty: 28 TABLET | Refills: 0 | Status: SHIPPED | OUTPATIENT
Start: 2019-08-12 | End: 2019-08-19

## 2019-08-12 RX ORDER — ACETAMINOPHEN 500 MG
1000 TABLET ORAL ONCE
Status: COMPLETED | OUTPATIENT
Start: 2019-08-12 | End: 2019-08-12

## 2019-08-12 RX ORDER — ROCURONIUM BROMIDE 10 MG/ML
INJECTION, SOLUTION INTRAVENOUS PRN
Status: DISCONTINUED | OUTPATIENT
Start: 2019-08-12 | End: 2019-08-12 | Stop reason: SDUPTHER

## 2019-08-12 RX ORDER — SODIUM CHLORIDE, SODIUM LACTATE, POTASSIUM CHLORIDE, CALCIUM CHLORIDE 600; 310; 30; 20 MG/100ML; MG/100ML; MG/100ML; MG/100ML
INJECTION, SOLUTION INTRAVENOUS CONTINUOUS
Status: DISCONTINUED | OUTPATIENT
Start: 2019-08-12 | End: 2019-08-12 | Stop reason: HOSPADM

## 2019-08-12 RX ORDER — BUPIVACAINE HYDROCHLORIDE AND EPINEPHRINE 5; 5 MG/ML; UG/ML
INJECTION, SOLUTION PERINEURAL PRN
Status: DISCONTINUED | OUTPATIENT
Start: 2019-08-12 | End: 2019-08-12 | Stop reason: ALTCHOICE

## 2019-08-12 RX ORDER — DEXAMETHASONE SODIUM PHOSPHATE 4 MG/ML
INJECTION, SOLUTION INTRA-ARTICULAR; INTRALESIONAL; INTRAMUSCULAR; INTRAVENOUS; SOFT TISSUE PRN
Status: DISCONTINUED | OUTPATIENT
Start: 2019-08-12 | End: 2019-08-12 | Stop reason: SDUPTHER

## 2019-08-12 RX ADMIN — OXYCODONE HYDROCHLORIDE AND ACETAMINOPHEN 1 TABLET: 5; 325 TABLET ORAL at 13:54

## 2019-08-12 RX ADMIN — ONDANSETRON 4 MG: 2 INJECTION INTRAMUSCULAR; INTRAVENOUS at 11:22

## 2019-08-12 RX ADMIN — SODIUM CHLORIDE, POTASSIUM CHLORIDE, SODIUM LACTATE AND CALCIUM CHLORIDE: 600; 310; 30; 20 INJECTION, SOLUTION INTRAVENOUS at 09:18

## 2019-08-12 RX ADMIN — ROCURONIUM BROMIDE 50 MG: 10 SOLUTION INTRAVENOUS at 11:06

## 2019-08-12 RX ADMIN — HYDROMORPHONE HYDROCHLORIDE 0.25 MG: 1 INJECTION, SOLUTION INTRAMUSCULAR; INTRAVENOUS; SUBCUTANEOUS at 12:58

## 2019-08-12 RX ADMIN — ENOXAPARIN SODIUM 40 MG: 40 INJECTION SUBCUTANEOUS at 09:57

## 2019-08-12 RX ADMIN — FENTANYL CITRATE 100 MCG: 50 INJECTION, SOLUTION INTRAMUSCULAR; INTRAVENOUS at 11:06

## 2019-08-12 RX ADMIN — APREPITANT 40 MG: 40 CAPSULE ORAL at 09:55

## 2019-08-12 RX ADMIN — FENTANYL CITRATE 100 MCG: 50 INJECTION, SOLUTION INTRAMUSCULAR; INTRAVENOUS at 11:22

## 2019-08-12 RX ADMIN — PROPOFOL 200 MG: 10 INJECTION, EMULSION INTRAVENOUS at 11:06

## 2019-08-12 RX ADMIN — Medication 2 G: at 11:07

## 2019-08-12 RX ADMIN — SUGAMMADEX 200 MG: 100 INJECTION, SOLUTION INTRAVENOUS at 12:05

## 2019-08-12 RX ADMIN — LIDOCAINE HYDROCHLORIDE 50 MG: 20 INJECTION, SOLUTION INFILTRATION; PERINEURAL at 11:06

## 2019-08-12 RX ADMIN — ACETAMINOPHEN 1000 MG: 500 TABLET ORAL at 09:54

## 2019-08-12 RX ADMIN — LIDOCAINE HYDROCHLORIDE 1 ML: 10 INJECTION, SOLUTION EPIDURAL; INFILTRATION; INTRACAUDAL; PERINEURAL at 09:19

## 2019-08-12 RX ADMIN — MIDAZOLAM HYDROCHLORIDE 2 MG: 2 INJECTION, SOLUTION INTRAMUSCULAR; INTRAVENOUS at 10:57

## 2019-08-12 RX ADMIN — FENTANYL CITRATE 50 MCG: 50 INJECTION, SOLUTION INTRAMUSCULAR; INTRAVENOUS at 11:14

## 2019-08-12 RX ADMIN — DEXAMETHASONE SODIUM PHOSPHATE 8 MG: 4 INJECTION, SOLUTION INTRAMUSCULAR; INTRAVENOUS at 11:22

## 2019-08-12 RX ADMIN — SODIUM CHLORIDE, POTASSIUM CHLORIDE, SODIUM LACTATE AND CALCIUM CHLORIDE: 600; 310; 30; 20 INJECTION, SOLUTION INTRAVENOUS at 11:32

## 2019-08-12 ASSESSMENT — PULMONARY FUNCTION TESTS
PIF_VALUE: 7
PIF_VALUE: 1
PIF_VALUE: 1
PIF_VALUE: 24
PIF_VALUE: 27
PIF_VALUE: 14
PIF_VALUE: 28
PIF_VALUE: 14
PIF_VALUE: 15
PIF_VALUE: 27
PIF_VALUE: 23
PIF_VALUE: 27
PIF_VALUE: 15
PIF_VALUE: 28
PIF_VALUE: 13
PIF_VALUE: 23
PIF_VALUE: 24
PIF_VALUE: 17
PIF_VALUE: 15
PIF_VALUE: 28
PIF_VALUE: 28
PIF_VALUE: 5
PIF_VALUE: 27
PIF_VALUE: 0
PIF_VALUE: 11
PIF_VALUE: 24
PIF_VALUE: 15
PIF_VALUE: 28
PIF_VALUE: 14
PIF_VALUE: 23
PIF_VALUE: 24
PIF_VALUE: 15
PIF_VALUE: 26
PIF_VALUE: 27
PIF_VALUE: 0
PIF_VALUE: 4
PIF_VALUE: 22
PIF_VALUE: 15
PIF_VALUE: 0
PIF_VALUE: 27
PIF_VALUE: 13
PIF_VALUE: 16
PIF_VALUE: 27
PIF_VALUE: 26
PIF_VALUE: 21
PIF_VALUE: 21
PIF_VALUE: 13
PIF_VALUE: 8
PIF_VALUE: 27
PIF_VALUE: 16
PIF_VALUE: 1
PIF_VALUE: 12
PIF_VALUE: 28
PIF_VALUE: 14
PIF_VALUE: 16
PIF_VALUE: 27
PIF_VALUE: 2
PIF_VALUE: 27
PIF_VALUE: 28
PIF_VALUE: 27
PIF_VALUE: 26
PIF_VALUE: 28
PIF_VALUE: 12
PIF_VALUE: 14
PIF_VALUE: 0
PIF_VALUE: 27
PIF_VALUE: 27
PIF_VALUE: 15
PIF_VALUE: 15
PIF_VALUE: 23
PIF_VALUE: 27
PIF_VALUE: 14
PIF_VALUE: 14
PIF_VALUE: 16

## 2019-08-12 ASSESSMENT — PAIN DESCRIPTION - DESCRIPTORS: DESCRIPTORS: ACHING

## 2019-08-12 ASSESSMENT — PAIN SCALES - GENERAL
PAINLEVEL_OUTOF10: 3
PAINLEVEL_OUTOF10: 4
PAINLEVEL_OUTOF10: 4
PAINLEVEL_OUTOF10: 3
PAINLEVEL_OUTOF10: 0

## 2019-08-12 ASSESSMENT — PAIN DESCRIPTION - PROGRESSION
CLINICAL_PROGRESSION: GRADUALLY WORSENING
CLINICAL_PROGRESSION: GRADUALLY WORSENING

## 2019-08-12 ASSESSMENT — PAIN DESCRIPTION - LOCATION: LOCATION: GROIN

## 2019-08-12 ASSESSMENT — PAIN - FUNCTIONAL ASSESSMENT: PAIN_FUNCTIONAL_ASSESSMENT: 0-10

## 2019-08-12 ASSESSMENT — PAIN DESCRIPTION - ONSET: ONSET: ON-GOING

## 2019-08-12 ASSESSMENT — PAIN DESCRIPTION - PAIN TYPE: TYPE: SURGICAL PAIN

## 2019-08-12 ASSESSMENT — PAIN DESCRIPTION - FREQUENCY: FREQUENCY: CONTINUOUS

## 2019-08-12 ASSESSMENT — LIFESTYLE VARIABLES: SMOKING_STATUS: 0

## 2019-08-12 ASSESSMENT — PAIN DESCRIPTION - ORIENTATION: ORIENTATION: LEFT

## 2019-08-13 LAB
EKG ATRIAL RATE: 52 BPM
EKG DIAGNOSIS: NORMAL
EKG P AXIS: 52 DEGREES
EKG P-R INTERVAL: 200 MS
EKG Q-T INTERVAL: 430 MS
EKG QRS DURATION: 108 MS
EKG QTC CALCULATION (BAZETT): 399 MS
EKG R AXIS: 10 DEGREES
EKG T AXIS: 41 DEGREES
EKG VENTRICULAR RATE: 52 BPM

## 2019-08-13 PROCEDURE — 93010 ELECTROCARDIOGRAM REPORT: CPT | Performed by: INTERNAL MEDICINE

## 2019-08-16 ENCOUNTER — TELEPHONE (OUTPATIENT)
Dept: SURGERY | Age: 64
End: 2019-08-16

## 2019-08-16 NOTE — TELEPHONE ENCOUNTER
Pt notified of additional advice, advised to elevate legs, stay active and follow up with his pcp if he develops any shortness of breathe.

## 2019-08-27 ENCOUNTER — OFFICE VISIT (OUTPATIENT)
Dept: SURGERY | Age: 64
End: 2019-08-27

## 2019-08-27 VITALS
DIASTOLIC BLOOD PRESSURE: 72 MMHG | WEIGHT: 184 LBS | BODY MASS INDEX: 24.92 KG/M2 | HEIGHT: 72 IN | SYSTOLIC BLOOD PRESSURE: 124 MMHG

## 2019-08-27 DIAGNOSIS — Z09 POSTOP CHECK: Primary | ICD-10-CM

## 2019-08-27 PROCEDURE — 99024 POSTOP FOLLOW-UP VISIT: CPT | Performed by: SURGERY

## 2020-08-11 ENCOUNTER — OFFICE VISIT (OUTPATIENT)
Dept: ORTHOPEDIC SURGERY | Age: 65
End: 2020-08-11
Payer: COMMERCIAL

## 2020-08-11 VITALS — WEIGHT: 184 LBS | HEIGHT: 72 IN | BODY MASS INDEX: 24.92 KG/M2

## 2020-08-11 PROCEDURE — 99213 OFFICE O/P EST LOW 20 MIN: CPT | Performed by: ORTHOPAEDIC SURGERY

## 2020-08-11 PROCEDURE — 20611 DRAIN/INJ JOINT/BURSA W/US: CPT | Performed by: ORTHOPAEDIC SURGERY

## 2020-08-11 RX ORDER — MELOXICAM 15 MG/1
15 TABLET ORAL DAILY
Qty: 90 TABLET | Refills: 1 | Status: SHIPPED | OUTPATIENT
Start: 2020-08-11 | End: 2022-06-22

## 2020-08-11 RX ORDER — BETAMETHASONE SODIUM PHOSPHATE AND BETAMETHASONE ACETATE 3; 3 MG/ML; MG/ML
12 INJECTION, SUSPENSION INTRA-ARTICULAR; INTRALESIONAL; INTRAMUSCULAR; SOFT TISSUE ONCE
Status: COMPLETED | OUTPATIENT
Start: 2020-08-11 | End: 2020-08-11

## 2020-08-11 RX ORDER — BUPIVACAINE HYDROCHLORIDE 5 MG/ML
30 INJECTION, SOLUTION PERINEURAL ONCE
Status: COMPLETED | OUTPATIENT
Start: 2020-08-11 | End: 2020-08-11

## 2020-08-11 RX ADMIN — BUPIVACAINE HYDROCHLORIDE 150 MG: 5 INJECTION, SOLUTION PERINEURAL at 10:04

## 2020-08-11 RX ADMIN — BETAMETHASONE SODIUM PHOSPHATE AND BETAMETHASONE ACETATE 12 MG: 3; 3 INJECTION, SUSPENSION INTRA-ARTICULAR; INTRALESIONAL; INTRAMUSCULAR; SOFT TISSUE at 10:03

## 2020-08-11 NOTE — PROGRESS NOTES
2+.  Neurological: The patient has good coordination. There is no weakness or sensory deficit. Body mass index is 24.95 kg/m². Left knee Examination:    Inspection:  No erythema or signs of infection. There are no cutaneous lesions    Palpation:  There is tenderness to palpation along the medial and lateral joint line. Range of Motion: 0 extension to 110 of active flexion. Strength:  4+/5 quadriceps and hamstrings    Special Tests: The knee is stable to varus valgus stressing/anterior posterior drawer. Negative Homans test.                                 Skin: There are no rashes, ulcerations or lesions. Gait: mildly antalgic favoring the left side      Examination of the right knee reveals intact skin. There is no focal tenderness. The patient demonstrates full painless range of motion with regard to flexion and extension. Strength is 5/5 throughout all planes. Ligamentous stability is grossly intact. Examination of the left hip reveals intact skin. The patient demonstrates full painless range of motion with regards to flexion, abduction, internal and external rotation. There is no tenderness about the greater trochanter. There is a negative straight leg raise against resistance. Strength is 5/5 throughout all planes. Radiology:   X-rays obtained and reviewed in office:  Views 4 views including AP weightbearing, PA flexed weightbearing, lateral, and skyline  Location left knee:    Impression:   There is near end-stage osteoarthritis within the medial compartment with sclerosis and osteophyte formation present. The patellofemoral joint mild to moderate degenerative changes with peripheral osteophyte formation  No fractures are identified. Impression:  Encounter Diagnoses   Name Primary?     Left knee pain, unspecified chronicity Yes    Primary osteoarthritis of left knee        Office Procedures:  Orders Placed This Encounter   Procedures    XR KNEE LEFT (MIN 4 VIEWS)     Standing Status:   Future     Number of Occurrences:   1     Standing Expiration Date:   8/5/2021     Today we have had discussion regarding treatment options and the patient would like to proceed with cortisone injection into the left knee followed with possible future Visco supplementation. Also discussed the use of meloxicam instead of Aleve on a daily basis. I discussed in detail the risks, benefits and complications of an injection which included but are not limited to infection, skin reactions, hot swollen joint, and anaphylaxis with the patient. The patient verbalized understanding and gave informed consent for the injection. The patient's left knee was slightly flexed with a bolster underneath the knee and the skin prepped using betadine/sterile alcohol solution. A sterile 22-gauge needle was inserted into the left knee and the mixture of 2ml Beta-Beta, 3 mL of 0.25% Marcaine  was injected under sterile technique. The needle was withdrawn and the puncture site sealed with a Band-Aid. Technique: Under sterile conditions a SonPharnext ultrasound unit with a variable frequency (6.0-15.0 MHz) linear transducer was used to localize the placement of a 22-gauge needle into the left knee joint. Findings: Successful needle placement for knee injection. Final images were taken and saved for permanent record. The patient tolerated the injection well. The patient was instructed to call the office immediately if there is any pain, redness, warmth, fever, or chills. Treatment Plan:  I discussed with the patient the nature of osteoarthritis of the knee. We talked about treatment of arthritis and the various options that are involved with this. The patient understands that the treatments can vary from essentially doing nothing to a total joint replacement arthroplasty for arthritis. I then went on to describe the utilization of glucosamine and chondroitin sulfate as a joint nutrition product.  We talked about the fact that this is essentially a joint vitamin with typically minimal side effects. We also talked about utilization of prescription over-the-counter anti-inflammatory medications as the next option. We also discussed the possibility of brace wear or orthotic wear if the patient has significant varus alignment. We then went on to discuss the possibility of Visco supplementation with hyaluronate products. We talked about the typical course of this type of treatment and the fact that often times in the treatment for significant arthritis, this is successful less than half the time. We also talked about the corticosteroid injections and the fact that this can give a brief window of relief, but does not cure the problem; in fact, the pain often has a rebound effect in 6-10 weeks after the steroid has worn off. We also discussed arthroscopy surgery in attempts to debride the joint, but the fact that this is relatively unreliable treatment in the face of significant arthritis. It can occasionally be used, particularly if there is significant meniscus pathology. Lastly we discussed total joint replacement arthroplasty as the final and definitive step in treatment of arthritis. Patient realizes the magnitude of this type of treatment as well as having voiced a general understanding to the duration of the prosthesis. The patient voiced understanding to these continuum of treatment options. We did perform a cortisone injection today into the left knee and postinjection precautions were discussed. Patient will follow-up in future for possible Visco supplementation.

## 2020-08-18 ENCOUNTER — TELEPHONE (OUTPATIENT)
Dept: ORTHOPEDIC SURGERY | Age: 65
End: 2020-08-18

## 2020-08-18 NOTE — TELEPHONE ENCOUNTER
Patient said he is having more pain than before he had his cortizone injection. Wanted to know what the next step is? Dictation says possible visco.  Does he need to come back in or can we just order?

## 2020-08-18 NOTE — TELEPHONE ENCOUNTER
It has barely been 1 week since the injection. I would give it at least another 7-10 days before we make a decision on whether cortisone injection was working or not. At that point I think we can request the viscosupplementation injections. He is still painful without seeing him back.

## 2020-08-20 NOTE — TELEPHONE ENCOUNTER
Left message for patient to call next week if he was still having pain and we could order the injections.

## 2021-01-13 ENCOUNTER — OFFICE VISIT (OUTPATIENT)
Dept: FAMILY MEDICINE CLINIC | Age: 66
End: 2021-01-13
Payer: COMMERCIAL

## 2021-01-13 VITALS
BODY MASS INDEX: 26.44 KG/M2 | OXYGEN SATURATION: 97 % | HEART RATE: 80 BPM | SYSTOLIC BLOOD PRESSURE: 156 MMHG | DIASTOLIC BLOOD PRESSURE: 82 MMHG | WEIGHT: 195 LBS | TEMPERATURE: 97.5 F

## 2021-01-13 DIAGNOSIS — Z01.818 PRE-OP TESTING: Primary | ICD-10-CM

## 2021-01-13 PROCEDURE — 93000 ELECTROCARDIOGRAM COMPLETE: CPT | Performed by: FAMILY MEDICINE

## 2021-01-13 PROCEDURE — 99242 OFF/OP CONSLTJ NEW/EST SF 20: CPT | Performed by: FAMILY MEDICINE

## 2021-01-13 ASSESSMENT — PATIENT HEALTH QUESTIONNAIRE - PHQ9
1. LITTLE INTEREST OR PLEASURE IN DOING THINGS: 0
SUM OF ALL RESPONSES TO PHQ9 QUESTIONS 1 & 2: 0
SUM OF ALL RESPONSES TO PHQ QUESTIONS 1-9: 0

## 2021-01-13 NOTE — PROGRESS NOTES
[facility]  [phone]  Fax: [phone]   Pre-operative History and Physical      DIAGNOSIS:  Rupture of ankle tendon    PROCEDURE:  Rt ankle tendon reattachment and calcaneus reconstruction      History Obtained From:  patient    HISTORY OF PRESENT ILLNESS:    The patient is a 60 y.o. male with significant past medical history of hx of tendon rupture about 5 weeks ago who presents for preop exam       Past Medical History:   Diagnosis Date    CAD (coronary artery disease)     [address] [facility], Dr. Collier Man tunnel syndrome, left 2/15/2017    Fractures     Hyperlipidemia     Hypertension     MI (myocardial infarction) ([facility] [address] 2004    PONV (postoperative nausea and vomiting)     Primary localized osteoarthrosis, hand 10/8/2014     Past Surgical History:   Procedure Laterality Date    CARPAL TUNNEL RELEASE Right 1996    CARPAL TUNNEL RELEASE Left 08/22/2017    CORONARY ANGIOPLASTY WITH STENT PLACEMENT  2004    single stent. Dr. Brittany Rodriguez at [facility] ([address]    ENDOSCOPY, COLON, DIAGNOSTIC      ESOPHAGEAL DILATATION  1/17/2019    ESOPHAGEAL DILATION GONZALEZ performed by Boris Baptiste MD at [address] [facility] HAND SURGERY      crushing injury   [facility] Left 8/12/2019    ROBOTIC LEFT INGUINAL HERNIA REPAIR WITH MESH performed by Prudence Roberts MD at [address] [facility]  ENDOSCOPY N/A 1/17/2019    EGD BIOPSY performed by Boris Baptiste MD at [address]       Current Outpatient Medications   Medication Sig Dispense Refill    meloxicam (MOBIC) 15 MG tablet Take 1 tablet by mouth daily 90 tablet 1    atorvastatin (LIPITOR) 20 MG tablet Take 20 mg by mouth daily       aspirin EC 81 MG EC tablet Take 1 tablet by mouth daily. 30 tablet 3     No current facility-administered medications for this visit.           Allergies:  Patient has no known allergies. History of allergic reaction to anesthesia:  No     Social History     Tobacco Use   Smoking Status Never Smoker   Smokeless Tobacco Never Used     The patient states he drinks zero per week. Family History   Problem Relation Age of Onset    Heart Disease Father     Heart Disease Paternal Grandfather     Cancer Neg Hx        REVIEW OF SYSTEMS:    CONSTITUTIONAL:  negative  EYES:  negative  HEENT:  negative  RESPIRATORY:  negative  CARDIOVASCULAR:  negative  GASTROINTESTINAL:  negative  GENITOURINARY:  negative  INTEGUMENT/BREAST:  negative  HEMATOLOGIC/LYMPHATIC:  negative  ALLERGIC/IMMUNOLOGIC:  negative  ENDOCRINE:  negative  MUSCULOSKELETAL:  negative  NEUROLOGICAL:  negative    PHYSICAL EXAM:      BP (!) 156/82   Pulse 80   Temp 97.5 °F (36.4 °C) (Temporal)   Wt 195 lb (88.5 kg)   SpO2 97%   BMI 26.44 kg/m²     CONSTITUTIONAL:  awake, alert, cooperative, no apparent distress, and appears stated age    Eyes:  Lids and lashes normal, pupils equal, round and reactive to light, extra ocular muscles intact, sclera clear, conjunctiva normal    Head/ENT:  Normocephalic, without obvious abnormality, atramatic, sinuses nontender on palpation, external ears without lesions, oral pharynx with moist mucus membranes, tonsils without erythema or exudates, gums normal and good dentition. Neck:  Supple, symmetrical, trachea midline, no adenopathy, thyroid symmetric, not enlarged and no tenderness, skin normal, No carotid bruit    Heart:  Normal apical impulse, regular rate and rhythm, normal S1 and S2, no S3 or S4, and no murmur noted    Lungs:  No increased work of breathing, good air exchange, clear to auscultation bilaterally, no crackles or wheezing    Abdomen:  No scars, normal bowel sounds, soft, non-distended, non-tender, no masses palpated, no hepatosplenomegally    Extremities:  No clubbing, cyanosis, or edema    NEUROLOGIC:  Awake, alert, oriented to name, place and time.   Cranial nerves II-XII are grossly intact. Motor is 5 out of 5 bilaterally. DATA:  EKG:  Date:  1/13/2021  I have reviewed EKG with the following interpretation:  Impression: old inferior infarct. Sinus rhythm. Unchanged from 8/12/2019          ASSESSMENT AND PLAN:    1. Patient is a 60 y.o. male with above specified procedure planned on 02/05/2021 with Dr. Becca Bennett at [facility]. 2.  Will get cleared by Dr. Augustina Berkowitz from cardiac perspective. 3.. Stop ASA/Hernadez medications 7-10 days prior to procedure.   4.  Cleared for surgery    Gonzalez Desir M.D    [address] [place] [address]  [phone]

## 2021-01-25 ENCOUNTER — TELEPHONE (OUTPATIENT)
Dept: FAMILY MEDICINE CLINIC | Age: 66
End: 2021-01-25

## 2021-01-25 NOTE — TELEPHONE ENCOUNTER
Beverly Holm called back to state they do not need a EKG, they are going to do their own EKG in their office. I asked if he needed anything from our office and he said no.

## 2021-01-25 NOTE — TELEPHONE ENCOUNTER
Klaudia Ayala from [facility] would like the patient's EKG results faxed to them. Fax # [phone]    Patient is in the office now.

## 2021-02-01 ENCOUNTER — TELEPHONE (OUTPATIENT)
Dept: FAMILY MEDICINE CLINIC | Age: 66
End: 2021-02-01

## 2021-02-01 NOTE — TELEPHONE ENCOUNTER
Steven Fletcher from [facility] is calling to state she needs the EKG reviewed and interpreted from 1.13.2021. Please fax to [phone] after completed.      # [phone]

## 2021-04-26 ENCOUNTER — HOSPITAL ENCOUNTER (EMERGENCY)
Age: 66
Discharge: HOME | End: 2021-04-26
Attending: EMERGENCY MEDICINE
Payer: COMMERCIAL

## 2021-04-26 ENCOUNTER — APPOINTMENT (OUTPATIENT)
Dept: GENERAL RADIOLOGY | Age: 66
End: 2021-04-26
Payer: COMMERCIAL

## 2021-04-26 VITALS
RESPIRATION RATE: 15 BRPM | WEIGHT: 190 LBS | BODY MASS INDEX: 25.73 KG/M2 | OXYGEN SATURATION: 99 % | HEIGHT: 72 IN | DIASTOLIC BLOOD PRESSURE: 68 MMHG | HEART RATE: 68 BPM | SYSTOLIC BLOOD PRESSURE: 127 MMHG | TEMPERATURE: 98.6 F

## 2021-04-26 DIAGNOSIS — S91.332A PUNCTURE WOUND OF LEFT FOOT, INITIAL ENCOUNTER: Primary | ICD-10-CM

## 2021-04-26 PROCEDURE — 73630 X-RAY EXAM OF FOOT: CPT

## 2021-04-26 PROCEDURE — 99285 EMERGENCY DEPT VISIT HI MDM: CPT

## 2021-04-26 PROCEDURE — 10060 I&D ABSCESS SIMPLE/SINGLE: CPT

## 2021-04-26 PROCEDURE — 6370000000 HC RX 637 (ALT 250 FOR IP): Performed by: EMERGENCY MEDICINE

## 2021-04-26 RX ORDER — CIPROFLOXACIN 750 MG/1
750 TABLET, FILM COATED ORAL 2 TIMES DAILY
Qty: 14 TABLET | Refills: 0 | Status: SHIPPED | OUTPATIENT
Start: 2021-04-26 | End: 2021-05-03

## 2021-04-26 RX ADMIN — CIPROFLOXACIN 750 MG: 500 TABLET, FILM COATED ORAL at 21:31

## 2021-04-26 ASSESSMENT — PAIN DESCRIPTION - PROGRESSION: CLINICAL_PROGRESSION: NOT CHANGED

## 2021-04-26 ASSESSMENT — PAIN DESCRIPTION - LOCATION: LOCATION: FOOT

## 2021-04-27 NOTE — ED PROVIDER NOTES
[address]Kaleb Jordan Final  Foreign Body in Skin (Pt sts \"stepped on a nail yesterday\", left foot)       HISTORY OF PRESENT ILLNESS  Lilly Edmond III is a 60 y.o. male who presents to the ED with left foot pain. Stepped on a nail in a plank yesterday. Removed it. Wife used a chlorhexidine scrub to try to clean the wound. Has also been trying epsom salt soaks. Since then has had increased redness and swelling of the foot. Not painful at this time. Denies fever, chest pain, SOB, nausea, vomiting, diarrhea. Tetanus status is up to date. No other complaints, modifying factors or associated symptoms. I have reviewed the following from the nursing documentation:    Past Medical History:   Diagnosis Date    CAD (coronary artery disease)     [address] [facility], Dr. Toth Shayne tunnel syndrome, left 2/15/2017    Fractures     Hyperlipidemia     Hypertension     MI (myocardial infarction) ([facility] [address] 2004    PONV (postoperative nausea and vomiting)     Primary localized osteoarthrosis, hand 10/8/2014     Past Surgical History:   Procedure Laterality Date    CARPAL TUNNEL RELEASE Right 1996    CARPAL TUNNEL RELEASE Left 08/22/2017    CORONARY ANGIOPLASTY WITH STENT PLACEMENT  2004    single stent.   Dr. Sinan Nieto at [facility] ([address])   [address] [facility], DIAGNOSTIC      ESOPHAGEAL DILATATION  1/17/2019    ESOPHAGEAL DILATION [place] Yudith performed by Harmony Coyle MD at [address]      crushing injury   [address] Left 8/12/2019    ROBOTIC LEFT INGUINAL HERNIA REPAIR WITH MESH [facility] N/A 1/17/2019    EGD BIOPSY performed by Harmony Coyle MD at [address] EXTRACTION       Family History   Problem Relation Age of Onset    Heart Disease Father     Heart Disease Paternal Grandfather     Cancer Neg Hx      Social History     Socioeconomic History    Marital status:      Spouse name: Not on file    Number of children: Not on file    Years of education: Not on file    Highest education level: Not on file   Occupational History    Not on file   Social Needs    Financial resource strain: Not on file    Food insecurity     Worry: Not on file     Inability: Not on file    Transportation needs     Medical: Not on file     Non-medical: Not on file   Tobacco Use    Smoking status: Never Smoker    Smokeless tobacco: Never Used   Substance and Sexual Activity    Alcohol use: Yes     Alcohol/week: 2.0 standard drinks     Types: 2 Cans of beer per week    Drug use: No    Sexual activity: Yes     Partners: Female   Lifestyle    Physical activity     Days per week: Not on file     Minutes per session: Not on file    Stress: Not on file   Relationships    Social connections     Talks on phone: Not on file     Gets together: Not on file     Attends [employer] service: Not on file     Active member of club or organization: Not on file     Attends meetings of clubs or organizations: Not on file     Relationship status: Not on file    Intimate partner violence     Fear of current or ex partner: Not on file     Emotionally abused: Not on file     Physically abused: Not on file     Forced sexual activity: Not on file   Other Topics Concern    Not on file   Social History Narrative    Not on file     No current facility-administered medications for this encounter. Current Outpatient Medications   Medication Sig Dispense Refill    ciprofloxacin (CIPRO) 750 MG tablet Take 1 tablet by mouth 2 times daily for 7 days 14 tablet 0    meloxicam (MOBIC) 15 MG tablet Take 1 tablet by mouth daily 90 tablet 1    atorvastatin (LIPITOR) 20 MG tablet Take 20 mg by mouth daily       aspirin EC 81 MG EC tablet Take 1 tablet by mouth daily.  30 tablet 3     No Known Allergies    REVIEW OF SYSTEMS  10 systems reviewed, pertinent positives and negatives per HPI, otherwise noted to be negative. PHYSICAL EXAM  ED Triage Vitals [04/26/21 2024]   BP Temp Temp Source Pulse Resp SpO2 Height Weight   119/80 98.6 °F (37 °C) Oral 68 16 97 % 6' (1.829 m) 190 lb (86.2 kg)     General appearance: Awake and alert. Cooperative. No acute distress. HENT: Normocephalic. Atraumatic. Mucous membranes are moist.  Eyes: PERRL. EOMI. Heart/Chest: RRR. No murmurs. Lungs: Respirations unlabored. CTAB. Good air exchange. Speaking comfortably in full sentences. Abdomen: Soft. Non-tender. Non-distended. No rebound or guarding. Musculoskeletal: There is a superficial appearing tiny puncture wound at the plantar aspect of the left foot toward the distal end of the fourth metatarsal with mild surrounding erythema and edema. 2+ dorsalis pedis and posterior tibialis pulses. Wiggles toes. Sensation intact to light touch. Skin: Warm and dry. No acute rashes. Skin lesion of the L foot as above. Neurological: Alert and oriented. CN II-XII intact. Gait normal.  Psychiatric: Mood/affect: normal      LABS  I have reviewed all labs for this visit. No results found for this visit on 04/26/21. RADIOLOGY  I have reviewed all radiographic studies for this visit. XR FOOT LEFT (MIN 3 VIEWS)   Final Result   0.3 cm linear density in the plantar mid to hindfoot of uncertain chronicity. ED COURSE/MDM  Patient seen and evaluated. Old records reviewed. Labs and imaging reviewed and results discussed with patient/family to extent possible. This is a 29-year-old male who presents for evaluation of a wound of the left foot after stepping on a nail yesterday. On arrival the patient is afebrile and nontoxic in appearance with otherwise reassuring vital signs.   His exam is notable for small puncture wound of the plantar aspect of the left foot towards the distal aspect of the fourth metatarsal.  X-ray of the foot shows a 0.3 cm linear density in the plantar mid to hindfoot. Clinically, I see no evidence of any foreign body or trauma to the foot at the area referenced on the x-ray. There is no bony abnormality. With respect to the puncture wound itself, the patient states that he was wearing socks and boots. I am concerned that there may be a small amount of radiolucent foreign material, likely sock, in the wound. With the patient's verbal consent I performed an incision and drainage of the wound which did reveal a tiny amount of pus as well as a tiny amount of black material consistent with sock. Wound was dressed. Will discharge the patient on ciprofloxacin. Blackbox warnings with regard to fluoroquinolones communicated. Patient consents to treatment with the medication class. Usual strict return precautions for new or worsening symptoms communicated. During the patient's ED course, the patient was given:  Medications   ciprofloxacin (CIPRO) tablet 750 mg (750 mg Oral Given 4/26/21 2131)        All questions were answered and the patient/family expressed understanding and agreement with the plan. PROCEDURES  Incision/Drainage    Date/Time: 4/26/2021 11:21 PM  Performed by: Carrie Lundberg MD  Authorized by: Carrie Lundberg MD     Consent:     Consent obtained:  Written    Consent given by:  Patient    Risks discussed:  Bleeding, incomplete drainage, pain, damage to other organs and infection    Alternatives discussed:  No treatment, delayed treatment and alternative treatment  Location:     Indications for incision and drainage: puncture wound with tiny abscess. Size:  0.1 cm    Location:  Lower extremity    Lower extremity location:  Foot    Foot location:  L foot  Pre-procedure details:     Skin preparation:  Betadine  Anesthesia (see MAR for exact dosages):      Anesthesia method:  Local infiltration    Local anesthetic:  Lidocaine 1% WITH epi  Procedure type:     Complexity:  Simple  Procedure

## 2021-04-27 NOTE — ED NOTES
.Reviewed discharge instructions with patient. Patient verbalized understanding. No distress noted. Ambulatory from department.      Deandra Gannon RN  04/26/21 8963

## 2021-09-05 ENCOUNTER — APPOINTMENT (OUTPATIENT)
Dept: CT IMAGING | Age: 66
End: 2021-09-05
Payer: MEDICARE

## 2021-09-05 ENCOUNTER — HOSPITAL ENCOUNTER (EMERGENCY)
Age: 66
Discharge: HOME | End: 2021-09-05
Attending: EMERGENCY MEDICINE
Payer: MEDICARE

## 2021-09-05 ENCOUNTER — APPOINTMENT (OUTPATIENT)
Dept: GENERAL RADIOLOGY | Age: 66
End: 2021-09-05
Payer: MEDICARE

## 2021-09-05 VITALS
SYSTOLIC BLOOD PRESSURE: 124 MMHG | TEMPERATURE: 98.1 F | DIASTOLIC BLOOD PRESSURE: 85 MMHG | WEIGHT: 190 LBS | OXYGEN SATURATION: 97 % | HEIGHT: 72 IN | RESPIRATION RATE: 16 BRPM | BODY MASS INDEX: 25.73 KG/M2 | HEART RATE: 49 BPM

## 2021-09-05 DIAGNOSIS — R03.0 ELEVATED BLOOD PRESSURE READING: ICD-10-CM

## 2021-09-05 DIAGNOSIS — S46.002A UNSP INJ MUSC/TEND THE ROTATOR CUFF OF L SHOULDER, INIT: ICD-10-CM

## 2021-09-05 DIAGNOSIS — S16.1XXA CERVICAL STRAIN, ACUTE, INITIAL ENCOUNTER: ICD-10-CM

## 2021-09-05 DIAGNOSIS — S49.92XA INJURY OF LEFT SHOULDER AND UPPER ARM, INITIAL ENCOUNTER: Primary | ICD-10-CM

## 2021-09-05 DIAGNOSIS — R91.8 PULMONARY NODULES: ICD-10-CM

## 2021-09-05 LAB
A/G RATIO: 1.3 (ref 1.1–2.2)
ALBUMIN SERPL-MCNC: 4.2 G/DL (ref 3.4–5)
ALP BLD-CCNC: 181 U/L (ref 40–129)
ALT SERPL-CCNC: 48 U/L (ref 10–40)
ANION GAP SERPL CALCULATED.3IONS-SCNC: 8 MMOL/L (ref 3–16)
AST SERPL-CCNC: 33 U/L (ref 15–37)
BASOPHILS ABSOLUTE: 0 K/UL (ref 0–0.2)
BASOPHILS RELATIVE PERCENT: 0.2 %
BILIRUB SERPL-MCNC: 0.3 MG/DL (ref 0–1)
BUN BLDV-MCNC: 24 MG/DL (ref 7–20)
CALCIUM SERPL-MCNC: 9.1 MG/DL (ref 8.3–10.6)
CHLORIDE BLD-SCNC: 104 MMOL/L (ref 99–110)
CO2: 25 MMOL/L (ref 21–32)
CREAT SERPL-MCNC: 1.1 MG/DL (ref 0.8–1.3)
EOSINOPHILS ABSOLUTE: 0.2 K/UL (ref 0–0.6)
EOSINOPHILS RELATIVE PERCENT: 2.4 %
GFR AFRICAN AMERICAN: >60
GFR NON-AFRICAN AMERICAN: >60
GLOBULIN: 3.3 G/DL
GLUCOSE BLD-MCNC: 108 MG/DL (ref 70–99)
HCT VFR BLD CALC: 45.5 % (ref 40.5–52.5)
HEMOGLOBIN: 15.3 G/DL (ref 13.5–17.5)
LYMPHOCYTES ABSOLUTE: 1.3 K/UL (ref 1–5.1)
LYMPHOCYTES RELATIVE PERCENT: 15.3 %
MCH RBC QN AUTO: 30.7 PG (ref 26–34)
MCHC RBC AUTO-ENTMCNC: 33.7 G/DL (ref 31–36)
MCV RBC AUTO: 91.1 FL (ref 80–100)
MONOCYTES ABSOLUTE: 0.8 K/UL (ref 0–1.3)
MONOCYTES RELATIVE PERCENT: 9.2 %
NEUTROPHILS ABSOLUTE: 6.2 K/UL (ref 1.7–7.7)
NEUTROPHILS RELATIVE PERCENT: 72.9 %
PDW BLD-RTO: 13.9 % (ref 12.4–15.4)
PLATELET # BLD: 245 K/UL (ref 135–450)
PMV BLD AUTO: 7.3 FL (ref 5–10.5)
POTASSIUM REFLEX MAGNESIUM: 4.4 MMOL/L (ref 3.5–5.1)
RBC # BLD: 4.99 M/UL (ref 4.2–5.9)
SODIUM BLD-SCNC: 137 MMOL/L (ref 136–145)
TOTAL PROTEIN: 7.5 G/DL (ref 6.4–8.2)
WBC # BLD: 8.5 K/UL (ref 4–11)

## 2021-09-05 PROCEDURE — 71260 CT THORAX DX C+: CPT

## 2021-09-05 PROCEDURE — 73110 X-RAY EXAM OF WRIST: CPT

## 2021-09-05 PROCEDURE — 99285 EMERGENCY DEPT VISIT HI MDM: CPT

## 2021-09-05 PROCEDURE — 73010 X-RAY EXAM OF SHOULDER BLADE: CPT

## 2021-09-05 PROCEDURE — 2580000003 HC RX 258: Performed by: PHYSICIAN ASSISTANT

## 2021-09-05 PROCEDURE — 72125 CT NECK SPINE W/O DYE: CPT

## 2021-09-05 PROCEDURE — 80053 COMPREHEN METABOLIC PANEL: CPT

## 2021-09-05 PROCEDURE — 6360000002 HC RX W HCPCS: Performed by: PHYSICIAN ASSISTANT

## 2021-09-05 PROCEDURE — 73030 X-RAY EXAM OF SHOULDER: CPT

## 2021-09-05 PROCEDURE — 73060 X-RAY EXAM OF HUMERUS: CPT

## 2021-09-05 PROCEDURE — 96374 THER/PROPH/DIAG INJ IV PUSH: CPT

## 2021-09-05 PROCEDURE — 6360000004 HC RX CONTRAST MEDICATION: Performed by: EMERGENCY MEDICINE

## 2021-09-05 PROCEDURE — 96375 TX/PRO/DX INJ NEW DRUG ADDON: CPT

## 2021-09-05 PROCEDURE — 85025 COMPLETE CBC W/AUTO DIFF WBC: CPT

## 2021-09-05 PROCEDURE — 6370000000 HC RX 637 (ALT 250 FOR IP): Performed by: PHYSICIAN ASSISTANT

## 2021-09-05 RX ORDER — HYDROCODONE BITARTRATE AND ACETAMINOPHEN 5; 325 MG/1; MG/1
1 TABLET ORAL EVERY 6 HOURS PRN
Qty: 8 TABLET | Refills: 0 | Status: SHIPPED | OUTPATIENT
Start: 2021-09-05 | End: 2021-09-07

## 2021-09-05 RX ORDER — LISINOPRIL 20 MG/1
5 TABLET ORAL DAILY
COMMUNITY
End: 2022-09-29 | Stop reason: DRUGHIGH

## 2021-09-05 RX ORDER — MORPHINE SULFATE 4 MG/ML
4 INJECTION, SOLUTION INTRAMUSCULAR; INTRAVENOUS ONCE
Status: COMPLETED | OUTPATIENT
Start: 2021-09-05 | End: 2021-09-05

## 2021-09-05 RX ORDER — OXYCODONE HYDROCHLORIDE AND ACETAMINOPHEN 5; 325 MG/1; MG/1
1 TABLET ORAL ONCE
Status: COMPLETED | OUTPATIENT
Start: 2021-09-05 | End: 2021-09-05

## 2021-09-05 RX ORDER — 0.9 % SODIUM CHLORIDE 0.9 %
1000 INTRAVENOUS SOLUTION INTRAVENOUS ONCE
Status: COMPLETED | OUTPATIENT
Start: 2021-09-05 | End: 2021-09-05

## 2021-09-05 RX ORDER — METHOCARBAMOL 500 MG/1
500 TABLET, FILM COATED ORAL 4 TIMES DAILY
Qty: 20 TABLET | Refills: 0 | Status: SHIPPED | OUTPATIENT
Start: 2021-09-05 | End: 2021-09-10

## 2021-09-05 RX ORDER — ONDANSETRON 2 MG/ML
4 INJECTION INTRAMUSCULAR; INTRAVENOUS ONCE
Status: COMPLETED | OUTPATIENT
Start: 2021-09-05 | End: 2021-09-05

## 2021-09-05 RX ORDER — IBUPROFEN 600 MG/1
600 TABLET ORAL ONCE
Status: COMPLETED | OUTPATIENT
Start: 2021-09-05 | End: 2021-09-05

## 2021-09-05 RX ADMIN — OXYCODONE HYDROCHLORIDE AND ACETAMINOPHEN 1 TABLET: 5; 325 TABLET ORAL at 15:06

## 2021-09-05 RX ADMIN — ONDANSETRON HYDROCHLORIDE 4 MG: 2 INJECTION, SOLUTION INTRAMUSCULAR; INTRAVENOUS at 16:50

## 2021-09-05 RX ADMIN — IOPAMIDOL 75 ML: 755 INJECTION, SOLUTION INTRAVENOUS at 17:59

## 2021-09-05 RX ADMIN — MORPHINE SULFATE 4 MG: 4 INJECTION INTRAVENOUS at 16:50

## 2021-09-05 RX ADMIN — IBUPROFEN 600 MG: 600 TABLET, FILM COATED ORAL at 15:06

## 2021-09-05 RX ADMIN — SODIUM CHLORIDE 1000 ML: 9 INJECTION, SOLUTION INTRAVENOUS at 16:51

## 2021-09-05 ASSESSMENT — PAIN DESCRIPTION - FREQUENCY
FREQUENCY: CONTINUOUS

## 2021-09-05 ASSESSMENT — PAIN SCALES - GENERAL
PAINLEVEL_OUTOF10: 1
PAINLEVEL_OUTOF10: 7

## 2021-09-05 ASSESSMENT — PAIN DESCRIPTION - ONSET
ONSET: SUDDEN
ONSET: ON-GOING
ONSET: ON-GOING

## 2021-09-05 ASSESSMENT — ENCOUNTER SYMPTOMS
BACK PAIN: 0
ABDOMINAL PAIN: 0
VOMITING: 0
SHORTNESS OF BREATH: 0
COLOR CHANGE: 0

## 2021-09-05 ASSESSMENT — PAIN DESCRIPTION - PROGRESSION
CLINICAL_PROGRESSION: NOT CHANGED
CLINICAL_PROGRESSION: NOT CHANGED
CLINICAL_PROGRESSION: GRADUALLY WORSENING
CLINICAL_PROGRESSION: NOT CHANGED

## 2021-09-05 ASSESSMENT — PAIN DESCRIPTION - LOCATION
LOCATION: SHOULDER

## 2021-09-05 ASSESSMENT — PAIN DESCRIPTION - ORIENTATION: ORIENTATION: RIGHT

## 2021-09-05 ASSESSMENT — PAIN SCALES - WONG BAKER: WONGBAKER_NUMERICALRESPONSE: 0

## 2021-09-05 ASSESSMENT — PAIN DESCRIPTION - PAIN TYPE
TYPE: ACUTE PAIN

## 2021-09-05 NOTE — ED PROVIDER NOTES
[address] ED  EMERGENCY DEPARTMENT ENCOUNTER        Pt Name: Rajesh Barraza III  MRN: [id number]  [place] 1955  Date of evaluation: 9/5/2021  Provider: Ysabel Jennings PA-C  PCP: Kenneth Wagner MD    Shared Visit or Autonomous Visit:  I have seen and evaluated this patient with my supervising physician Latonya Lock MD.    [address] [place] [address]       Chief Complaint   Patient presents with    Fall     left shoulder and wrist, hit the ground, happened around noon. HISTORY OF PRESENT ILLNESS   (Location/Symptom, Timing/Onset, Context/Setting, Quality, Duration, Modifying Factors, Severity)  Note limiting factors. Josseline Lee is a 60 y.o. male presenting to the emergency department for evaluation of left arm injury. He was knocked down by a bull hit on the lateral aspect of his shoulder and his arm. Having pain to the periscapular area left shoulder, some pain in his neck, pain to the upper arm and at the left wrist.  Unable to raise his left arm up. Moving the lower arm without difficult. Denies any head injury. No loss of consciousness. No headache. No back pain. No chest pain or difficulty breathing. No abdominal pain. No vomiting. The history is provided by the patient. Fall  The accident occurred less than 1 hour ago. The fall occurred while standing. There was no blood loss. Point of impact: left shoulder and arm. Pain location: left shoulder, upper arm, wrist. He was ambulatory at the scene. There was no entrapment after the fall. Pertinent negatives include no fever, no numbness, no abdominal pain, no vomiting, no headaches and no loss of consciousness. Nursing Notes were reviewed    REVIEW OF SYSTEMS    (2-9 systems for level 4, 10 or more for level 5)     Review of Systems   Constitutional: Negative for fever. Respiratory: Negative for shortness of breath. Cardiovascular: Negative for chest pain.    Gastrointestinal: Negative for abdominal pain and vomiting. Musculoskeletal: Positive for neck pain. Negative for back pain. Left shoulder, arm and wrist pain. Unable to raise arm. Skin: Negative for color change and wound. Neurological: Negative for loss of consciousness, syncope, numbness and headaches. All other systems reviewed and are negative. Positives and Pertinent negatives as per HPI. PAST MEDICAL HISTORY     Past Medical History:   Diagnosis Date    CAD (coronary artery disease)     [address] [facility], Dr. Harris [place] tunnel syndrome, left 2/15/2017    Fractures     Hyperlipidemia     Hypertension     MI (myocardial infarction) ([facility] [address] 2004    PONV (postoperative nausea and vomiting)     Primary localized osteoarthrosis, hand 10/8/2014         SURGICAL HISTORY     Past Surgical History:   Procedure Laterality Date    CARPAL TUNNEL RELEASE Right 1996    CARPAL TUNNEL RELEASE Left 08/22/2017    CORONARY ANGIOPLASTY WITH STENT PLACEMENT  2004    single stent. Dr. Kj Murillo at [facility] ([address])   [address] [facility], DIAGNOSTIC      ESOPHAGEAL DILATATION  1/17/2019    ESOPHAGEAL DILATION [place] performed by Aarti Morales MD at [address] [facility] HAND SURGERY      crushing injury   [address] Left 8/12/2019    ROBOTIC LEFT INGUINAL HERNIA REPAIR WITH MESH performed by Stephanie Panda MD at [address] ENDOSCOPY N/A 1/17/2019    EGD BIOPSY performed by Aarti Morales MD at [address]       Previous Medications    ASPIRIN EC 81 MG EC TABLET    Take 1 tablet by mouth daily.     ATORVASTATIN (LIPITOR) 20 MG TABLET    Take 20 mg by mouth daily     LISINOPRIL (PRINIVIL;ZESTRIL) 20 MG TABLET    Take 20 mg by mouth daily    MELOXICAM (MOBIC) 15 MG TABLET    Take 1 tablet by mouth daily         ALLERGIES     Patient has no known allergies. FAMILYHISTORY       Family History   Problem Relation Age of Onset    Heart Disease Father     Heart Disease Paternal Grandfather     Cancer Neg Hx           SOCIAL HISTORY       Social History     Socioeconomic History    Marital status:      Spouse name: None    Number of children: None    Years of education: None    Highest education level: None   Occupational History    None   Tobacco Use    Smoking status: Never Smoker    Smokeless tobacco: Never Used   Vaping Use    Vaping Use: Never used   Substance and Sexual Activity    Alcohol use: Yes     Alcohol/week: 2.0 standard drinks     Types: 2 Cans of beer per week    Drug use: No    Sexual activity: Yes     Partners: Female   Other Topics Concern    None   Social History Narrative    None     Social Determinants of Health     Financial Resource Strain:     Difficulty of Paying Living Expenses:    Food Insecurity:     Worried About Running Out of Food in the Last Year:     Ran Out of Food in the Last Year:    Transportation Needs:     Lack of Transportation (Medical):      Lack of Transportation (Non-Medical):    Physical Activity:     Days of Exercise per Week:     Minutes of Exercise per Session:    Stress:     Feeling of Stress :    Social Connections:     Frequency of Communication with Friends and Family:     Frequency of Social Gatherings with Friends and Family:     Attends Yarsani Services:     Active Member of Clubs or Organizations:     Attends Club or Organization Meetings:     Marital Status:    Intimate Partner Violence:     Fear of Current or Ex-Partner:     Emotionally Abused:     Physically Abused:     Sexually Abused:        SCREENINGS    [place] Coma Scale  Eye Opening: Spontaneous  Best Verbal Response: Oriented  Best Motor Response: Obeys commands  Connie Coma Scale Score: 15        PHYSICAL EXAM    (up to 7 for level 4, 8 or more for level 5)     ED Triage Vitals   BP Temp Temp Source Pulse Resp SpO2 Height Weight   09/05/21 1411 09/05/21 1355 09/05/21 1355 09/05/21 1411 09/05/21 1355 09/05/21 1411 09/05/21 1355 09/05/21 1355   121/88 98.1 °F (36.7 °C) Oral 65 16 98 % 6' (1.829 m) 190 lb (86.2 kg)       Physical Exam  Vitals and nursing note reviewed. Constitutional:       Appearance: He is well-developed. He is not toxic-appearing. HENT:      Head: Normocephalic and atraumatic. Mouth/Throat:      Mouth: Mucous membranes are moist.      Pharynx: Oropharynx is clear. No pharyngeal swelling or posterior oropharyngeal erythema. Eyes:      Extraocular Movements: Extraocular movements intact. Conjunctiva/sclera: Conjunctivae normal.      Pupils: Pupils are equal, round, and reactive to light. Cardiovascular:      Rate and Rhythm: Normal rate and regular rhythm. Pulses: Normal pulses. Heart sounds: Normal heart sounds. Pulmonary:      Effort: Pulmonary effort is normal. No respiratory distress. Breath sounds: Normal breath sounds. No stridor. No wheezing, rhonchi or rales. Chest:      Chest wall: No tenderness. Abdominal:      General: Bowel sounds are normal.      Palpations: Abdomen is soft. Abdomen is not rigid. Tenderness: There is no abdominal tenderness. There is no right CVA tenderness, left CVA tenderness, guarding or rebound. Musculoskeletal:         General: Normal range of motion. Left shoulder: Tenderness present. No deformity or crepitus. Normal pulse. Cervical back: Normal range of motion and neck supple. Tenderness (left paraspinus area tenderness. no bony step offs or crepitus.) present. Thoracic back: No tenderness or bony tenderness. Lumbar back: No tenderness or bony tenderness. Comments: Tenderness to palpation left periscapular area. No deformity. No open wounds. No bony step-offs. Intact sensation symmetric. Unable to abduct right arm. He is flexing extending at the elbow and wrist without difficulty. Distal pulses 2+. Cap refill less than 2 seconds. Skin:     General: Skin is warm and dry. Capillary Refill: Capillary refill takes less than 2 seconds. Neurological:      Mental Status: He is alert and oriented to person, place, and time. GCS: GCS eye subscore is 4. GCS verbal subscore is 5. GCS motor subscore is 6. Cranial Nerves: No cranial nerve deficit. Sensory: Sensation is intact. No sensory deficit. Motor: No abnormal muscle tone. Coordination: Coordination normal.   Psychiatric:         Speech: Speech normal.         Behavior: Behavior normal.         Thought Content:  Thought content normal.         DIAGNOSTIC RESULTS   LABS:    Labs Reviewed   COMPREHENSIVE METABOLIC PANEL W/ REFLEX TO MG FOR LOW K - Abnormal; Notable for the following components:       Result Value    Glucose 108 (*)     BUN 24 (*)     Alkaline Phosphatase 181 (*)     ALT 48 (*)     All other components within normal limits    Narrative:     Performed at:  [place] [address],  [employer] [facility]   Phone [phone]   CBC WITH AUTO DIFFERENTIAL    Narrative:     Performed at:  [place] [address],  [employer], [facility]   Phone [phone]     Results for orders placed or performed during the hospital encounter of 09/05/21   CBC Auto Differential   Result Value Ref Range    WBC 8.5 4.0 - 11.0 K/uL    RBC 4.99 4.20 - 5.90 M/uL    Hemoglobin 15.3 13.5 - 17.5 g/dL    Hematocrit 45.5 40.5 - 52.5 %    MCV 91.1 80.0 - 100.0 fL    MCH 30.7 26.0 - 34.0 pg    MCHC 33.7 31.0 - 36.0 g/dL    RDW 13.9 12.4 - 15.4 %    Platelets 809 189 - 874 K/uL    MPV 7.3 5.0 - 10.5 fL    Neutrophils % 72.9 %    Lymphocytes % 15.3 %    Monocytes % 9.2 %    Eosinophils % 2.4 %    Basophils % 0.2 %    Neutrophils Absolute 6.2 1.7 - 7.7 K/uL    Lymphocytes Absolute 1.3 1.0 - 5.1 K/uL    Monocytes Absolute 0.8 0.0 - 1.3 K/uL    Eosinophils Absolute 0.2 0.0 - 0.6 K/uL    Basophils Absolute 0.0 0.0 - 0.2 K/uL   Comprehensive Metabolic Panel w/ Reflex to MG   Result Value Ref Range    Sodium 137 136 - 145 mmol/L    Potassium reflex Magnesium 4.4 3.5 - 5.1 mmol/L    Chloride 104 99 - 110 mmol/L    CO2 25 21 - 32 mmol/L    Anion Gap 8 3 - 16    Glucose 108 (H) 70 - 99 mg/dL    BUN 24 (H) 7 - 20 mg/dL    CREATININE 1.1 0.8 - 1.3 mg/dL    GFR Non-African American >60 >60    GFR African American >60 >60    Calcium 9.1 8.3 - 10.6 mg/dL    Total Protein 7.5 6.4 - 8.2 g/dL    Albumin 4.2 3.4 - 5.0 g/dL    Albumin/Globulin Ratio 1.3 1.1 - 2.2    Total Bilirubin 0.3 0.0 - 1.0 mg/dL    Alkaline Phosphatase 181 (H) 40 - 129 U/L    ALT 48 (H) 10 - 40 U/L    AST 33 15 - 37 U/L    Globulin 3.3 g/dL       All other labs were within normal range or not returned as of this dictation. EKG: All EKG's are interpreted by the Emergency Department Physician in the absence of a cardiologist.  Please see their note for interpretation of EKG. RADIOLOGY:   Non-plain film images such as CT, Ultrasound and MRI are read by the radiologist. Plain radiographic images are visualized andpreliminarily interpreted by the  ED Provider with the below findings:        Interpretation [facility] Radiologist below, if available at the time of this note:    XR SCAPULA LEFT (COMPLETE)   Final Result   No acute osseous abnormality of the left scapula. Redemonstration of   moderate AC and glenohumeral degenerative change. 2.5 cm soft tissue nodule in the left upper lobe axillary region. CT   evaluation recommended. CT CERVICAL SPINE WO CONTRAST   Final Result   1. No acute abnormality of the cervical spine. 2. Solid pulmonary nodule of the right lung apex measuring 4 mm. If the   patient is low risk for lung cancer, no follow-up is advised. If high risk,   optional follow-up chest CT in 12 months per Fleischner criteria.          XR WRIST LEFT (MIN 3 VIEWS)   Final Result There is a 2.5 cm nodule projecting in the chest.  A dedicated CT of the   chest with contrast is recommended at this time. No acute osseous abnormality of the left shoulder, left humerus or left   wrist.  Degenerative changes as described above. XR SHOULDER LEFT (MIN 2 VIEWS)   Final Result   There is a 2.5 cm nodule projecting in the chest.  A dedicated CT of the   chest with contrast is recommended at this time. No acute osseous abnormality of the left shoulder, left humerus or left   wrist.  Degenerative changes as described above. XR HUMERUS LEFT (MIN 2 VIEWS)   Final Result   There is a 2.5 cm nodule projecting in the chest.  A dedicated CT of the   chest with contrast is recommended at this time. No acute osseous abnormality of the left shoulder, left humerus or left   wrist.  Degenerative changes as described above. CT CHEST W CONTRAST    (Results Pending)     No results found.         PROCEDURES   Unless otherwise noted below, none     Procedures    CRITICAL CARE TIME   N/A    CONSULTS:  None      EMERGENCY DEPARTMENT COURSE and DIFFERENTIAL DIAGNOSIS/MDM:   Vitals:    Vitals:    09/05/21 1502 09/05/21 1604 09/05/21 1701 09/05/21 1811   BP: (!) 135/96 (!) 137/104 (!) 139/91 (!) 152/87   Pulse: 68   55   Resp:    16   Temp:       TempSrc:       SpO2: 99% 99% 97% 99%   Weight:       Height:           Patient was given thefollowing medications:  Medications   oxyCODONE-acetaminophen (PERCOCET) 5-325 MG per tablet 1 tablet (1 tablet Oral Given 9/5/21 1506)   ibuprofen (ADVIL;MOTRIN) tablet 600 mg (600 mg Oral Given 9/5/21 1506)   0.9 % sodium chloride bolus (0 mLs IntraVENous Stopped 9/5/21 1815)   morphine sulfate (PF) injection 4 mg (4 mg IntraVENous Given 9/5/21 1650)   ondansetron (ZOFRAN) injection 4 mg (4 mg IntraVENous Given 9/5/21 1650)   iopamidol (ISOVUE-370) 76 % injection 75 mL (75 mLs IntraVENous Given 9/5/21 1759)       ED course  Patient presented to the ER Prescriptions    HYDROCODONE-ACETAMINOPHEN (NORCO) 5-325 MG PER TABLET    Take 1 tablet by mouth every 6 hours as needed for Pain for up to 2 days. Take lowest effective dose    METHOCARBAMOL (ROBAXIN) 500 MG TABLET    Take 1 tablet by mouth 4 times daily for 20 doses       DISCONTINUED MEDICATIONS:  Discontinued Medications    No medications on file         Periodic Controlled Substance Monitoring: No signs of potential drug abuse or diversion identified.  Lidia Cordero PA-C)    (Please note that portions ofthis note were completed with a voice recognition program.  Efforts were made to edit the dictations but occasionally words are mis-transcribed.)    Caren Cerna PA-C (electronically signed)            Lidia Cordero PA-C  09/05/21 7380

## 2021-09-05 NOTE — ED NOTES
Reports \" my bull got out of the pen and I fell. \" reports pain to the left shoulder and wrist. Notable guarding.       Dong Michel, LEDA  09/05/21 1040

## 2021-09-07 DIAGNOSIS — M25.512 ACUTE PAIN OF LEFT SHOULDER: Primary | ICD-10-CM

## 2021-09-09 ENCOUNTER — OFFICE VISIT (OUTPATIENT)
Dept: ORTHOPEDIC SURGERY | Age: 66
End: 2021-09-09
Payer: MEDICARE

## 2021-09-09 VITALS — BODY MASS INDEX: 25.73 KG/M2 | HEIGHT: 72 IN | WEIGHT: 190 LBS

## 2021-09-09 DIAGNOSIS — M75.102 NONTRAUMATIC TEAR OF LEFT ROTATOR CUFF, UNSPECIFIED TEAR EXTENT: Primary | ICD-10-CM

## 2021-09-09 DIAGNOSIS — M25.512 ACUTE PAIN OF LEFT SHOULDER: ICD-10-CM

## 2021-09-09 PROCEDURE — 1036F TOBACCO NON-USER: CPT | Performed by: ORTHOPAEDIC SURGERY

## 2021-09-09 PROCEDURE — 99203 OFFICE O/P NEW LOW 30 MIN: CPT | Performed by: ORTHOPAEDIC SURGERY

## 2021-09-09 PROCEDURE — 3017F COLORECTAL CA SCREEN DOC REV: CPT | Performed by: ORTHOPAEDIC SURGERY

## 2021-09-09 PROCEDURE — G8427 DOCREV CUR MEDS BY ELIG CLIN: HCPCS | Performed by: ORTHOPAEDIC SURGERY

## 2021-09-09 PROCEDURE — G8419 CALC BMI OUT NRM PARAM NOF/U: HCPCS | Performed by: ORTHOPAEDIC SURGERY

## 2021-09-09 NOTE — ED PROVIDER NOTES
I independently examined and evaluated Leann Iglesias III. In brief, patient is a 78-year-old male presents to the emergency department for evaluation after he was knocked down by a bull. Patient reports the bowl was trying to escape the fence and he was trying to push him back in when he was pushed over and he fell onto his left shoulder. Reports having pain and difficulty lifting his arm up since this occurred. Denies having associated numbness or tingling. Denies having a weak . Reports it is the arm abduction that causes the most pain. Focused exam revealed alert, oriented male x3, answering questions appropriately, and following commands. Head was normocephalic and atraumatic. Patient denies any injury to head or loss of consciousness. Although patient is on baby aspirin daily, he reports that injury occurred several hours ago and he is not having any headache, vision changes, nausea, vomiting, and I have low concern for intracranial bleed at this time. He had no midline C/T/L-spine step-offs or tenderness palpation. No crepitus on palpation of the chest wall. Did have tenderness to palpation over the posterior left scapula under the scapular spine. He had limited abduction at the left shoulder. He had full pronation and supination at the left upper extremity. He had 2+ radial pulses, intact sensation over radial/median/ulnar nerve distribution. Differential diagnosis includes fracture, dislocation, rotator cuff injury, among others. Due to the incidental finding of the pulmonary nodules on the chest x-ray, CT chest was ordered. CT chest showed a 2.3 cm left upper lobe pulmonary nodule. Discussed that this could be a granuloma versus malignancy and discussed the importance of following up with PCP for further evaluation and treatment. They verbalized understanding and were agreeable with plan. They will follow up with PCP within the next few days to discuss these findings.   Patient provided with referral for orthopedics for further evaluation and treatment for the left shoulder pain and decreased abduction concerning for a rotator cuff injury. Patient discharged home with strict return precautions. All diagnostic, treatment, and disposition decisions were made by myself in conjunction with the advanced practice provider. For all further details of the patient's emergency department visit, please see the advanced practice provider's documentation. Comment: Please note this report has been produced using speech recognition software and may contain errors related to that system including errors in grammar, punctuation, and spelling, as well as words and phrases that may be inappropriate. If there are any questions or concerns please feel free to contact the dictating provider for clarification.         Beto Yepez MD  09/09/21 3208

## 2021-09-09 NOTE — PROGRESS NOTES
ORTHOPAEDIC SURGERY H&P / CONSULTATION NOTE    Chief complaint:   Chief Complaint   Patient presents with    Shoulder Pain     left shoulder pain        History of present illness: The patient is a 60 y.o. male right hand dominant with subjective symptoms of left shoulder pain. The chief complaint is located at posterior aspect left shoulder. Duration of symptoms has been for 4 days. The severity of symptoms is rated at 1/10 pain but can be as high as 7/10 pain on intake form. He is accompanied by his wife. He states he was trying to salazar a bowl and ultimately had grabbed around his neck but was thrown off and hit the ground. He states limited range of motion and achy pain along the posterior aspect of the shoulder. He denies previous injury to the shoulder. Occasional discomfort at baseline but otherwise he had good functioning range of motion without significant pain. It wakes him up at night. He had gone to the emergency room where multiple imaging studies were obtained. He has been taking narcotic as needed    The patient has tried the below listed items prior to today's consultation for above listed chief complaint.     -   Over-the-counter anti-inflammatories/prescription medication anti-inflammatory. -   Physical therapy / guided home exercise program -     -   Previous corticosteroid injections    Past medical history:    Past Medical History:   Diagnosis Date    CAD (coronary artery disease)     [address] [facility], Dr. Roper [place] tunnel syndrome, left 2/15/2017    Fractures     Hyperlipidemia     Hypertension     MI (myocardial infarction) ([facility] [address] 2004    PONV (postoperative nausea and vomiting)     Primary localized osteoarthrosis, hand 10/8/2014        Past surgical history:    Past Surgical History:   Procedure Laterality Date    CARPAL TUNNEL RELEASE Right 1996    CARPAL TUNNEL RELEASE Left 08/22/2017    CORONARY ANGIOPLASTY WITH STENT PLACEMENT  2004    single stent. of Paying Living Expenses:    Food Insecurity:     Worried About [address] in the Last Year:     [address] in the Last Year:    Transportation Needs:     Lack of Transportation (Medical):  Lack of Transportation (Non-Medical):    Physical Activity:     Days of Exercise per Week:     Minutes of Exercise per Session:    Stress:     Feeling of Stress :    Social Connections:     Frequency of Communication with Friends and Family:     Frequency of Social Gatherings with Friends and Family:     Attends Hinduism Services:     Active Member of Clubs or Organizations:     Attends Club or Organization Meetings:     Marital Status:    Intimate Partner Violence:     Fear of Current or Ex-Partner:     Emotionally Abused:     Physically Abused:     Sexually Abused: Tobacco use. Social History     Tobacco Use   Smoking Status Never Smoker   Smokeless Tobacco Never Used     Employment: Noncontributory    Workers compensation claim: Noncontributory    Review of systems: Patient denies any fevers chills chest pain shortness of breath nausea vomiting significant weight loss any change in voiding or bowel movements. Patient denies any significant numbness or tingling at baseline as it relates to this presenting symptom/chief complaint. The patient denies any significant problems with skin or any significant allergies. Physical examination:  Body mass index is 25.77 kg/m².   AAOx3, NCAT  EOMI  MMM  RR  Unlabored breathing, no wheezing  Skin intact BUE and BLE, warm and moist  Bilateral upper extremity examination specific to subjective symptoms  Exam Left Shoulder  Active Range of Motion (FF/Abd/ER/IR)        30/30/20/sacrum             Passive Range of Motion (FF/Abd/ER/IR)     160/160  Positive   Neer,    positive Lara,      4/5   empty Can,          4 -/5 ER arm at the side,       5/5   belly Press,    5/5 bear Hug,       equivocal O'Briens,      trace TTP at Biceps Tendon Sheath, negative Speed, negative Yergeson, positive   TTP AC Joint, positive cross arm adduction, positive Daniel deformity. No gross tenderness at muscle belly. Nontender to palpation along the scapula. Skin intact throughout  5/5 D B T G IO EPL  SILT Ax, R, U, M  +2 radial pulse    Diagnostic imaging:  MY READ:  Radiographs 3 view 5 September 2021 and 2 view 9 September 2021: Negative fracture. Positive acromioclavicular joint arthrosis. Positive mild glenohumeral arthrosis with inferior head osteophyte however most pronounced acromiohumeral decrease space/distance however this is only seen on Grashey view. Type II acromion    Pertinent lab work: None     Diagnosis Orders   1. Nontraumatic tear of left rotator cuff, unspecified tear extent  MRI SHOULDER LEFT WO CONTRAST   2. Acute pain of left shoulder  XR SHOULDER LEFT (MIN 2 VIEWS)       Assessment and plan: 60 y.o. male with current subjective symptoms and physical exam findings with diagnostic imaging correlating to left shoulder rotator cuff tear-traumatic.  -Time of 16 minutes was spent coordinating and discussing the clinical findings, reviewing diagnostic imaging as indicated, coordinating care with prior notes review and current clinical encounter documentation as it pertains to the patient's presenting subjective symptoms and diagnoses. -I reviewed with the patient the imaging findings as well as clinical exam and  how it correlates to subjective symptoms.  -I had a pleasant discussion with the patient today. I reviewed with him that currently his clinical examination is concerning for a rotator cuff tear. This is primarily of the infraspinatus and supraspinatus. I also discussed with him that it would appear that he has a previous biceps proximal tendon rupture. He denies having seen this previously but his exam is relatively benign with this and there is no gross ecchymosis upon further discussion.   His radiographs also do show some sclerosis of the greater tuberosity and in the setting of single image which appears to have a decreased acromial humeral distance however this is only appreciated on one view and the scapular Y view does show good maintained joint space as well as other imaging.  -Continue activity modification to include low impact. OTC Tylenol Aleve per bottle as needed discomfort  -MRI has been ordered left shoulder rule out rotator cuff tendon tears given traumatic injury and with change in functional range of motion  -All questions answered to the patient's satisfaction and the patient expressed understanding and agreement with the above listed treatment plan  -Follow up after MRI completed to review and to discuss potential treatment options in association.  -Thank you for the clinical consultation and allowing me to participate in the patient's care. Electronically signed by Ty Goode MD on 9/9/21 at 8:26 AM EDT         Ty Goode MD       Orthopaedic Surgery-Sports Medicine        Disclaimer: This note was dictated with voice recognition software. Though review and correction are routinely performed, please contact the office/medical records for any errors requiring correction.

## 2021-09-16 ENCOUNTER — OFFICE VISIT (OUTPATIENT)
Dept: ORTHOPEDIC SURGERY | Age: 66
End: 2021-09-16
Payer: MEDICARE

## 2021-09-16 VITALS — HEIGHT: 72 IN | BODY MASS INDEX: 24.65 KG/M2 | WEIGHT: 182 LBS

## 2021-09-16 DIAGNOSIS — M25.512 ACUTE PAIN OF LEFT SHOULDER: ICD-10-CM

## 2021-09-16 DIAGNOSIS — M75.102 NONTRAUMATIC TEAR OF LEFT ROTATOR CUFF, UNSPECIFIED TEAR EXTENT: Primary | ICD-10-CM

## 2021-09-16 DIAGNOSIS — M19.012 OSTEOARTHRITIS OF GLENOHUMERAL JOINT, LEFT: ICD-10-CM

## 2021-09-16 PROCEDURE — 1123F ACP DISCUSS/DSCN MKR DOCD: CPT | Performed by: ORTHOPAEDIC SURGERY

## 2021-09-16 PROCEDURE — 4040F PNEUMOC VAC/ADMIN/RCVD: CPT | Performed by: ORTHOPAEDIC SURGERY

## 2021-09-16 PROCEDURE — G8420 CALC BMI NORM PARAMETERS: HCPCS | Performed by: ORTHOPAEDIC SURGERY

## 2021-09-16 PROCEDURE — 1036F TOBACCO NON-USER: CPT | Performed by: ORTHOPAEDIC SURGERY

## 2021-09-16 PROCEDURE — 3017F COLORECTAL CA SCREEN DOC REV: CPT | Performed by: ORTHOPAEDIC SURGERY

## 2021-09-16 PROCEDURE — 99213 OFFICE O/P EST LOW 20 MIN: CPT | Performed by: ORTHOPAEDIC SURGERY

## 2021-09-16 PROCEDURE — G8427 DOCREV CUR MEDS BY ELIG CLIN: HCPCS | Performed by: ORTHOPAEDIC SURGERY

## 2021-09-16 RX ORDER — MELOXICAM 15 MG/1
15 TABLET ORAL DAILY PRN
Qty: 30 TABLET | Refills: 0 | Status: SHIPPED | OUTPATIENT
Start: 2021-09-16 | End: 2022-06-22

## 2021-09-16 NOTE — PROGRESS NOTES
FOLLOW UP ORTHOPAEDIC NOTE    The patient follows up today for reevaluation of left shoulder pain. The patient states his pain is 0/10 pain on intake form. He is accompanied by his wife. He feels his range of motion and pain have gotten better over the last week. He received his MRI and is here to review results    PE:  AAOx3  RR  Unlabored breathing  Skin warm and moist  Focused physical examination of the left shoulder  140 degrees forward flexion albeit painful with the arc from 40-1 40  Remainder of examination unchanged    Pertinent radiographs/imaging:  MRI 9/10/2021: Full-thickness retracted supraspinatus and infraspinatus tears to the level of the glenoid. There is in association with edema and a flattening of the superior humeral head which appears to articulate with the sclerotic acromion. There is acromioclavicular joint arthrosis as well. Positive fluid in the biceps tendon sheath. Positive high-grade tendinosis in the subscapularis without jens tear. Grade 2/3 changes supraspinatus and infraspinatus. Positive edema. Significant/severe glenohumeral arthrosis also appreciated     Diagnosis Orders   1. Nontraumatic tear of left rotator cuff, unspecified tear extent  meloxicam (MOBIC) 15 MG tablet    [facility]   2. Osteoarthritis of glenohumeral joint, left     3. Acute pain of left shoulder       Assessment and plan: 73 male with continued subjective symptoms of left shoulder pain with known, correlating diagnosis of left shoulder acute on chronic rotator cuff tear arthropathy.  -Time of 16 minutes was spent coordinating and discussing the clinical findings and diagnostic imaging results as they pertain to the patient's presenting subjective symptoms.  -I had a pleasant discussion with the patient and his wife who accompanies him today. I reviewed with him nonoperative and operative treatment options.   I reviewed with him that consideration for surgical treatment options are currently limited with regard to salvage procedures. Given the amount of glenohumeral arthritis, I did not advocate for superior capsular reconstruction. I did review with him consideration for a reverse total shoulder replacement. Currently he is not interested in the 15 pound weight limit with this as he is extremely active still in his life. This is understandable. -  -Unfortunately, this leaves us with challenges to be able to address his baseline pain and symptoms in the setting of currently what appears to be an acute on chronic rotator cuff related pathology. I appreciate that he is likely well compensated previously in this acute trauma by way of wrestling a bowl to the ground/getting thrown to the ground has exacerbated his baseline chronic rotator cuff tendinopathy/chronic rotator cuff arthropathy. While there is baseline glenohumeral arthritis there, he has started to articulate with the acromion which is appreciated on flattening of the superior humeral head and with relative deformation of the inferior aspect of the acromion.  -Having said that, he wishes to pursue with formal physical therapy. A referral was placed for periscapular strengthening and stretching. He will continue this. I did offer corticosteroid injection into the subacromial space which would bathe the entire glenohumeral joint. Currently he wishes to hold off on this.  -Mobic 15 mg p.o. daily as needed pain was prescribed  -Should the patient wish for the corticosteroid injection, he may contact the office.   I did review with him that should he wish to pursue a corticosteroid injection, and then at some point consider surgery by way of a shoulder replacement, that we would need to wait at least 3 months after any corticosteroid injection in the setting of attempted shoulder replacement to limit infection  -All questions answered to the patient's satisfaction and the patient expressed understanding and agreement with the above listed treatment plan  -Follow up as needed  -Thank you for the clinical consultation and allowing me to participate in the patient's care. Electronically signed by Karla Lyons MD on 9/16/21 at 9:03 AM ADIEL Lyons MD       Orthopaedic Surgery-Sports Medicine    Disclaimer: This note was dictated with voice recognition software. Though review and correction are routinely performed, please contact the office/medical records for any errors requiring correction.

## 2021-09-29 ENCOUNTER — HOSPITAL ENCOUNTER (OUTPATIENT)
Dept: PHYSICAL THERAPY | Age: 66
Setting detail: THERAPIES SERIES
Discharge: HOME | End: 2021-09-29
Payer: MEDICARE

## 2021-09-29 PROCEDURE — 97110 THERAPEUTIC EXERCISES: CPT

## 2021-09-29 PROCEDURE — 97016 VASOPNEUMATIC DEVICE THERAPY: CPT

## 2021-09-29 PROCEDURE — 97161 PT EVAL LOW COMPLEX 20 MIN: CPT

## 2021-09-29 NOTE — PROGRESS NOTES
[address] [facility], [facility], [address]  Phone: [phone]                                                    Physical Therapy Certification    We had the pleasure of evaluating the following patient for physical therapy services at [address] [place] [address]. A summary of our findings can be found in the initial assessment below. This includes our plan of care. If you have any questions or concerns regarding these findings, please do not hesitate to contact me at the office phone number checked above. Thank you for the referral.       Physician Signature:_______________________________Date:__________________  By signing above (or electronic signature), therapists plan is approved by physician            UPPER EXTREMITY PHYSICAL THERAPY EVALUATION    Patient: Xuan Fernandez III   : 1955   MRN: [id number]    M75.102 (ICD-10-CM) - Nontraumatic tear of left rotator cuff, unspecified tear extent     Medical Diagnosis:                      ICD 10:      Treatment Diagnosis:  Same. 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 Onset Date:   Around 9/10/21     Referral Date:  21     Referring Physician: Dr. Davon Agarwal         Visits Allowed/Insurance/Certification Information:  Medicare  And BCBS    Precautions/ Contra-indications/Relevant Medical History:    C-SSRS Triggered by Intake questionnaire (Past 2 wk assessment):   [x] No, Questionnaire did not trigger screening.   [] Yes, Patient intake triggered further evaluation      [] C-SSRS Screening completed  [] PCP notified via Plan of Care  [] Emergency services notified     Pt Occupation/Job Duties:  Retired . Still works as a  every day about 4-6 hrs. Social support/Environment:   Lives with wife, has 9 acre   5 cattle, chickens. Health History reviewed with pt:  Yes.   HTN  Cardiac disease ( hx of MI)      SUBJECTIVE FINDINGS History of Present Illness:  9/29/21 about 2-3 wks ago he wrestled a bull to the ground and then could not move his arm at all. 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 Pertinent radiographs/imaging:  MRI 9/10/2021: Full-thickness retracted supraspinatus and infraspinatus tears to the level of the glenoid. There is in association with edema and a flattening of the superior humeral head which appears to articulate with the sclerotic acromion. There is acromioclavicular joint arthrosis as well. Positive fluid in the biceps tendon sheath. Positive high-grade tendinosis in the subscapularis without jens tear. Grade 2/3 changes supraspinatus and infraspinatus. Positive edema. Significant/severe glenohumeral arthrosis also appreciated       Diagnosis Orders   1. Nontraumatic tear of left rotator cuff, unspecified tear extent  meloxicam (MOBIC) 15 MG tablet     [facility]   2. Osteoarthritis of glenohumeral joint, left      3. Acute pain of left shoulder             Pain    Patient describes pain to be intermit L shoulder pain with movement. Patient reports  0/10 pain at rest,  3-4/10 pain with movement. 6/10 lifting  Worsened by  - trying to raise his arm forward and evon to the side  Improved by  - he is taking 1 meloxicam at night. He sleeps with meds. 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 unable to tolerate laying on side of pain,  , reaching overhead,      Patient goal for therapy:  Get stronger and improve the use of his L arm    OBJECTIVE FINDINGS    Posture  : stands with L sh girdle more protracted. Medial scapular winging.     Cervical Spine         Range of Motion/Strength Testing     Range Tested AROM PROM Resisted Comments   *denotes pain Left Right Left Right Left Right    Shoulder Flexion 150* 150 155 155 4-/5 5/5    Shoulder Extension 50 60   5/5 5/5    Shoulder Abduction 140* 140 145 150 4/5 5/5    Shoulder Adduction      /5 5/5    Shoulder IR T10* L2 75 70 4/5 5/5    Shoulder ER C7* T1 90 90 3-/5 5/5    Elbow Flexion     5/5 5/5    Elbow Extension      /5 /5    Pronation     /5 /5    Supination     /5 /5    Wrist Flexion     /5 /5    Wrist Extension     /5 /5    Radial Deviation     /5 /5    Ulnar Deviation     /5 /5         /5 /5      Flexibility   Limited by capsular restriction    Joint Mobility/Accessory Movements  : bilat capsular restriction. incr laxity L AC jt. Palpation/Tenderness    Note mild/moderate/severe tenderness with : + sub deltoid bursa, + biceps t. Tender over S/ I insertions    Special Tests    Test Right Left Comments   Drop arm  neg                                    Co-morbidities/Complexities (which will affect course of rehabilitation):  []None           Arthritic conditions   []Rheumatoid arthritis (M05.9)  []Osteoarthritis (M19.91)   Cardiovascular conditions   [x]Hypertension (I10)  []Hyperlipidemia (E78.5)  []Angina pectoris (I20)  [x]Atherosclerosis (I70) hx of MI   Musculoskeletal conditions   []Disc pathology   []Congenital spine pathologies   []Prior surgical intervention  []Osteoporosis (M81.8)  []Osteopenia (M85.8)   Endocrine conditions   []Hypothyroid (E03.9)  []Hyperthyroid Gastrointestinal conditions   []Constipation (D02.73)   Metabolic conditions   []Morbid obesity (E66.01)  []Diabetes type 1(E10.65) or 2 (E11.65)   []Neuropathy (G60.9)     Pulmonary conditions   []Asthma (J45)  []Coughing   []COPD (J44.9)   Psychological Disorders  []Anxiety (F41.9)  []Depression (F32.9)   []Other:   []Other:          Functional Outcome Measure    Measure used:  quickdash  Score:  22  % Disability:  25%    Additional Comments : 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      ASSESSMENT:   Functional Impairments   [x]Noted spinal or UE joint hypomobility   []Noted spinal or UE joint hypermobility   [x]Decreased UE functional ROM   [x]Decreased UE functional strength   []Abnormal reflexes/sensation/myotomal/dermatomal deficits   [x]Decreased RC/scapular/core strength and neuromuscular control   []other:      Functional Activity Limitations (from functional questionnaire and intake)   [x]Reduced ability to tolerate prolonged functional positions   []Reduced ability or difficulty with changes of positions or transfers between positions   []Reduced ability to maintain good posture and demonstrate good body mechanics with sitting, bending, and lifting   [x] Reduced ability or tolerance with driving and/or computer work   [x]Reduced ability to sleep   [x]Reduced ability to perform lifting, reaching, carrying tasks   [x]Reduced ability to tolerate impact through UE   []Reduced ability to reach behind back   []Reduced ability to  or hold objects   [x]Reduced ability to throw or toss an object   []other:    Participation Restrictions   []Reduced participation in self care activities   []Reduced participation in home management activities   [x]Reduced participation in work activities   []Reduced participation in social activities. []Reduced participation in sport/recreation activities. Classification:    []Signs/symptoms consistent with post-surgical status including decreased ROM, strength and function.   []Signs/symptoms consistent with joint sprain/strain   [x]Signs/symptoms consistent with shoulder impingement   []Signs/symptoms consistent with shoulder/elbow/wrist tendinopathy   [x]Signs/symptoms consistent with Rotator cuff tear   []Signs/symptoms consistent with labral tear   []Signs/symptoms consistent with postural dysfunction    []Signs/symptoms consistent with Glenohumeral IR Deficit - <45 degrees   []Signs/symptoms consistent with facet dysfunction of cervical/thoracic spine    []Signs/symptoms consistent with pathology which may benefit from Dry needling     []other: Rehabilitation Potential: Good for goals listed below. Strengths for achieving goals include: Motivated and does not want to have surgery  Limitations for achieving goals include:  Severity of his injury    Prognosis: [x]    Good [x]    Fair []    Poor    GOALS:  Short Term Goals:  1. Independent in HEP and progression per patient tolerance, in order to prevent re-injury. [] Progressing: [] Met: [] Not Met: [] Adjusted   2. Patient will have a decrease in pain to facilitate improvement in movement, function, and ADLs as indicated by Functional Deficits. [] Progressing: [] Met: [] Not Met: [] Adjusted     Long Term Goals:  1. Disability index score of 20% or less for the QuickDash/  to assist with reaching prior level of function. [] Progressing: [] Met: [] Not Met: [] Adjusted   2. Patient will demonstrate increased AROM to   150 flx and 140 abd without pain. to allow for proper joint functioning as indicated by patients Functional Deficits. [] Progressing: [] Met: [] Not Met: [] Adjusted   3. Patient will demonstrate an increase in strength to  4 to 4+ for rot cuff. 5/5 for scapular stabilizers. to allow for proper functional mobility as indicated by patients Functional Deficits. [] Progressing: [] Met: [] Not Met: [] Adjusted   4. Patient will return to all transfers, work activities, and functional activities without increased symptoms or restriction. [] Progressing: [] Met: [] Not Met: [] Adjusted   5. 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 [] Progressing: [] Met: [] Not Met: [] Adjusted   6.  Patient stated goal:  Get stronger, use my L arm better, avoid surgery  [] Progressing: [] Met: [] Not Met: [] Adjusted    PLAN OF CARE    To see patient  2  x/week for 4  weeks for the following treatment interventions:   Therapeutic Exercise   Progressive Resistive Exercise   Modalities of Choice (Heat/Cold/US/EStim/Ionto)   Home Exercise Program   Manual Techniques/Mobilization   Postural Reeducation    Physical Therapy Evaluation Complexity Justification  [x] EVAL (LOW) 49172 (typically 20 minutes face-to-face)  [] EVAL (MOD) 15095 (typically 30 minutes face-to-face)  [] EVAL (HIGH) 14202 (typically 45 minutes face-to-face)  [] RE-EVAL     Thank you for the referral of this patient.       Timed Code Treatment Minutes: 30  minutes      Total Treatment Time:  65 minutes    Rita Sharpe, PT  [id number]   [facility]

## 2021-09-29 NOTE — FLOWSHEET NOTE
3 [facility], [address]   Phone [phone]                                                [] Daily Treatment Note   [] Progress Note   [] Discharge Note      Date:  2021    Patient Name:  eMl Russell III        :  1955  M75.102 (ICD-10-CM) - Nontraumatic tear of left rotator cuff, unspecified tear extent                                  Medical Diagnosis:                                 ICD 10:       Treatment Diagnosis:  Same. 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     Onset Date:    Around 9/10/21                Referral Date:  21               Referring Physician: Dr. Amy Martin of care signed (Y/N):      Progress report will be due (10 Rx or 30 days whichever is less):      Recertification will be due (POC Duration  / 90 days whichever is less):  21    Visit# / total visits:   Visit # Insurance Allowable Auth Required   In Person  1  medicare and [employer] []  Yes     []  No    Tele Health 0  []  Yes     []  No    Total  1       Latex Allergy:  [x]NO      []YES    Pain level: 3-4/10 movement      6/10 lifting    Subjective:  21 about 2-3 wks ago he wrestled a bull to the ground and then could not move his arm at all. 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        Pertinent radiographs/imaging:  MRI 9/10/2021: Full-thickness retracted supraspinatus and infraspinatus tears to the level of the glenoid.  There is in association with edema and a flattening of the superior humeral head which appears to articulate with the sclerotic acromion.  There is acromioclavicular joint arthrosis as well.  Positive fluid in the biceps tendon sheath.  Positive high-grade tendinosis in the subscapularis without jens tear.  Grade 2/3 changes supraspinatus and infraspinatus.  Positive edema.  Significant/severe glenohumeral arthrosis also appreciated       Diagnosis Orders   1. Nontraumatic tear of left rotator cuff, unspecified tear extent  meloxicam (MOBIC) 15 MG tablet     [facility]   2. Osteoarthritis of glenohumeral joint, left      3. Acute pain of left shoulder               Pain    Patient describes pain to be intermit L shoulder pain with movement. Patient reports  0/10 pain at rest,  3-4/10 pain with movement. 6/10 lifting  Worsened by  - trying to raise his arm forward and evon to the side  Improved by  - he is taking 1 meloxicam at night. He sleeps with meds. 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 unable to tolerate laying on side of pain,  , reaching overhead,              Exercises:   Exercise/Equipment Resistance/Repetitions Other comments   9/29/21 explained course and role of PT     scap ex:  center sqz x10    50 a day   incr to 100                  Back pocekts \"                                 \"    Prone/leaning:  ext Blue  3x 10                    Short row/ Blue  3x 10                   horiz abd Green  3x 10   More difficult    Supine ceiling punch Black   bilat   3x10                Figure 8 balck  3x 10   L only                                                         NV: manual jt mobiliz, brief ROM, check HEP, ER in SL w/ weights or band, abd in SL w/ wts or band                                Therapeutic Exercise: 30 min      Group Therapy:      Home Exercise Program:      Therapeutic Activity:      Neuromuscular Re-education:      Gait:      Manual Therapy:  NV- g-h distrction, glides, brief ROM    Canalith Repositioning Procedure:       Modalities: gameready x 15 min for edema/inflam  L shoulder   36'  lgiht comp   Seated w/ arm pillow     Charges:  Timed Code Treatment Minutes:  30   Total Treatment Minutes:  65   BWC time for each procedure?:  TE TIME:  NMR TIME:  MANUAL TIME:  UNTIMED MINUTES:          [x] EVAL (LOW) 15609 (typically 20 minutes face-to-face)  [] EVAL (MOD) 77287 (typically 30 minutes face-to-face)  [] EVAL (HIGH) 09888 (typically 45 minutes face-to-face)  [] RE-EVAL     [x] GC(39196) x 2    [] IONTO  [] NMR (79239) x     [x] VASO  [] Manual (18136) x     [] Other:  [] TA x      [] Mech Traction (57579)  [] ES(attended) (80418)     [] ES (un) (52722): Medicare Cap Total YTD:  170.00    Treatment/Activity Tolerance:    [x] Patient tolerated treatment well [] Patient limited by fatigue   [] Patient limited by pain [] Patient limited by other medical complications   [] Other:     Prognosis: [x] Good [] Fair  [] Poor    Patient Requires Follow-up:  [x] Yes  [] No    Plan: [] Continue per plan of care [] Alter current plan (see comments)   [x] Plan of care initiated [] Hold pending MD visit [] Discharge    Plan for Next Session:  above  See Progress Note: [x] Yes  [] No       Next due:         Electronically signed by:  Yang Galvin PT, [employer] MOMT    Note: If patient does not return for scheduled/ recommended follow up visits, this note will serve as a discharge from care along with most recent update on progress. Outpatient Physical Therapy  Progress Note      Progress Note covers period from:     To       Subjective:           Objective:   Observation:   Test measurements:       Functional Outcome Measure:            Assessment:   Summary:    Patient's response to treatment:      Goals:  · Progress toward previous goals:      Plan:  ·     Electronically Signed by:

## 2021-10-01 ENCOUNTER — HOSPITAL ENCOUNTER (OUTPATIENT)
Dept: PHYSICAL THERAPY | Age: 66
Setting detail: THERAPIES SERIES
Discharge: HOME | End: 2021-10-01
Payer: COMMERCIAL

## 2021-10-01 PROCEDURE — 97140 MANUAL THERAPY 1/> REGIONS: CPT

## 2021-10-01 PROCEDURE — 97110 THERAPEUTIC EXERCISES: CPT

## 2021-10-01 NOTE — FLOWSHEET NOTE
Grade 2/3 changes supraspinatus and infraspinatus.  Positive edema.  Significant/severe glenohumeral arthrosis also appreciated       Diagnosis Orders   1. Nontraumatic tear of left rotator cuff, unspecified tear extent  meloxicam (MOBIC) 15 MG tablet     [facility]   2. Osteoarthritis of glenohumeral joint, left      3. Acute pain of left shoulder               Pain    Patient describes pain to be intermit L shoulder pain with movement. Patient reports  0/10 pain at rest,  3-4/10 pain with movement. 6/10 lifting  Worsened by  - trying to raise his arm forward and evon to the side  Improved by  - he is taking 1 meloxicam at night. He sleeps with meds. 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 unable to tolerate laying on side of pain,  , reaching overhead,              Exercises: dx  L rot cuff tear  Exercise/Equipment Resistance/Repetitions Other comments   9/29/21 explained course and role of PT     scap ex:  center sqz x10    50 a day   incr to 100                  Back pocekts \"                                 \"    Prone/leaning:  ext Blue  3x 10                    Short row/ Blue  3x 10                   horiz abd   3x 10   More difficult decr to red band   Supine ceiling punch Black   bilat   3x10                Figure 8 balck  3x 10   L only     10/1/21 SL sh abd 2x10  No wt.   difficult altn w/ ER                 SL   ER  W/ towel roll 2x10   No wt.   Very small motion difficult                                           NV: manual jt mobiliz, brief ROM, check HEP, ER in SL w/ weights or band, abd in SL w/ wts or band                                Therapeutic Exercise:  15 min      Group Therapy:      Home Exercise Program:      Therapeutic Activity:      Neuromuscular Re-education:      Gait:      Manual Therapy:   15 min   - g-h distrction, glides,   ROM  2x10    Tyrone Repositioning Procedure:       Modalities:CP x 10 min to R sh  In supine, sheet toga       Charges:  Timed Code Treatment Minutes:  30   Total Treatment Minutes:   45   BWC time for each procedure?:  TE TIME:  NMR TIME:  MANUAL TIME:  UNTIMED MINUTES:          [x] EVAL (LOW) 50270 (typically 20 minutes face-to-face)  [] EVAL (MOD) 43761 (typically 30 minutes face-to-face)  [] EVAL (HIGH) 81228 (typically 45 minutes face-to-face)  [] RE-EVAL     [x] DN(67208) x      [] IONTO  [] NMR (39199) x     [x] VASO  [x] Manual (65152) x     [] Other:  [] TA x      [] Mech Traction (20016)  [] ES(attended) (27693)     [] ES (un) (93326): Medicare Cap Total YTD:  250.00    Treatment/Activity Tolerance:    [x] Patient tolerated treatment well [] Patient limited by fatigue   [] Patient limited by pain [] Patient limited by other medical complications   [] Other:     Prognosis: [x] Good [] Fair  [] Poor    Patient Requires Follow-up:  [x] Yes  [] No    Plan: [x] Continue per plan of care [] Alter current plan (see comments)   [] Plan of care initiated [] Hold pending MD visit [] Discharge    Plan for Next Session:  above  See Progress Note: [x] Yes  [] No       Next due:         Electronically signed by:  Jayden Todd PT, [email or web address]   MOMEDWARD    Note: If patient does not return for scheduled/ recommended follow up visits, this note will serve as a discharge from care along with most recent update on progress. Outpatient Physical Therapy  Progress Note      Progress Note covers period from:     To       Subjective:           Objective:   Observation:   Test measurements:       Functional Outcome Measure:            Assessment:   Summary:    Patient's response to treatment:      Goals:  · Progress toward previous goals:      Plan:  ·     Electronically Signed by:

## 2021-10-05 ENCOUNTER — HOSPITAL ENCOUNTER (OUTPATIENT)
Dept: PHYSICAL THERAPY | Age: 66
Setting detail: THERAPIES SERIES
Discharge: HOME | End: 2021-10-05
Payer: COMMERCIAL

## 2021-10-05 PROCEDURE — 97110 THERAPEUTIC EXERCISES: CPT

## 2021-10-05 PROCEDURE — 97140 MANUAL THERAPY 1/> REGIONS: CPT

## 2021-10-05 NOTE — FLOWSHEET NOTE
[address] [place] [address] [facility] [address]  Phone [phone]                                                [] Daily Treatment Note   [] Progress Note   [] Discharge Note      Date:  10/5/2021    Patient Name:  Catarino Mendoza III        :  1955  M75.102 (ICD-10-CM) - Nontraumatic tear of left rotator cuff, unspecified tear extent                                  Medical Diagnosis:                                 ICD 10:       Treatment Diagnosis:  Same. 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     Onset Date:    Around 9/10/21                Referral Date:  21               Referring Physician: Dr. Teresita Franklin of care signed (Y/N):      Progress report will be due (10 Rx or 30 days whichever is less):      Recertification will be due (POC Duration  / 90 days whichever is less):  21    Visit# / total visits:   Visit # Insurance Allowable Auth Required   In Person 3  medicare and [facility] []  Yes     []  No    Tele Health 0  []  Yes     []  No    Total 3       Latex Allergy:  [x]NO      []YES    Pain level: 3-4/10 movement      6/10 lifting    Subjective:  10/5/21 reports he is about the same  10/1/21 doing about the same. Painting today, using his R arm overhead. 21 about 2-3 wks ago he wrestled a bull to the ground and then could not move his arm at all.   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        Pertinent radiographs/imaging:  MRI 9/10/2021: Full-thickness retracted supraspinatus and infraspinatus tears to the level of the glenoid.  There is in association with edema and a flattening of the superior humeral head which appears to articulate with the sclerotic acromion.  There is acromioclavicular joint arthrosis as well.  Positive fluid in the biceps tendon sheath.  Positive high-grade tendinosis in the subscapularis without jens tear.  Grade 2/3 changes supraspinatus and infraspinatus.  Positive edema.  Significant/severe glenohumeral arthrosis also appreciated       Diagnosis Orders   1. Nontraumatic tear of left rotator cuff, unspecified tear extent  meloxicam (MOBIC) 15 MG tablet     [facility] [place]   2. Osteoarthritis of glenohumeral joint, left      3. Acute pain of left shoulder                Pain    Patient describes pain to be intermit L shoulder pain with movement. Patient reports  0/10 pain at rest,  3-4/10 pain with movement. 6/10 lifting  Worsened by  - trying to raise his arm forward and evon to the side  Improved by  - he is taking 1 meloxicam at night. He sleeps with meds. 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 unable to tolerate laying on side of pain,  , reaching overhead,              Exercises: dx  L rot cuff tear  Exercise/Equipment Resistance/Repetitions Other comments   9/29/21 explained course and role of PT     scap ex:  center sqz x10    50 a day   incr to 100 x                 Back pocekts \"                                 \" x   Prone/leaning:  ext Blue  3x 10, 3# in dept x                   Short row/ black  3x 10, 5# in dept  x                  horiz abd Green  3x 10, 5# in dept    More difficult x   Supine ceiling punch Black   bilat   3x10, 5# in dept x               Figure 8 black  3x 10   L only, 5# in dept x    10/1/21 SL sh abd 2x10  No wt.   difficult xaltn w/ ER                 SL   ER  W/ towel roll 2x10   No wt.   Very small motion xdifficult    Still to difficult to add wt                                        NV: manual jt mobiliz, brief ROM, check HEP, ER in SL w/ weights or band, abd in SL w/ wts or band                                Therapeutic Exercise:  18 min  Some popping noted     Group Therapy:      Home Exercise Program: Therapeutic Activity:      Neuromuscular Re-education:      Gait:      Manual Therapy:   15 min   - g-h distrction, glides,   ROM  2x10,  Popping noted with flex    Canalith Repositioning Procedure:       Modalities:CP x declined min to R sh  In supine, sheet toga       Charges:  Timed Code Treatment Minutes: 33   Total Treatment Minutes:  33    BWC time for each procedure?:  TE TIME:  NMR TIME:  MANUAL TIME:  UNTIMED MINUTES:          [] EVAL (LOW) 57092 (typically 20 minutes face-to-face)  [] EVAL (MOD) 28365 (typically 30 minutes face-to-face)  [] EVAL (HIGH) 11890 (typically 45 minutes face-to-face)  [] RE-EVAL     [x] FI(14386) x    1  [] IONTO  [] NMR (22469) x     [] VASO  [x] Manual (46070) x  1   [] Other:  [] TA x      [] Mech Traction (16643)  [] ES(attended) (23616)     [] ES (un) (12892): Medicare Cap Total YTD:  306.00    Treatment/Activity Tolerance:    [x] Patient tolerated treatment well [] Patient limited by fatigue   [] Patient limited by pain [] Patient limited by other medical complications   [] Other:     Prognosis: [x] Good [] Fair  [] Poor    Patient Requires Follow-up:  [x] Yes  [] No    Plan: [x] Continue per plan of care [] Alter current plan (see comments)   [] Plan of care initiated [] Hold pending MD visit [] Discharge    Plan for Next Session:  above  See Progress Note: [] Yes  [x] No       Next due:         Electronically signed by:  Gabbie Rausch,  [id number]    Note: If patient does not return for scheduled/ recommended follow up visits, this note will serve as a discharge from care along with most recent update on progress. Outpatient Physical Therapy  Progress Note      Progress Note covers period from:     To       Subjective:           Objective:   Observation:   Test measurements:       Functional Outcome Measure:            Assessment:   Summary:    Patient's response to treatment:      Goals:  · Progress toward previous goals: Plan:  ·     Electronically Signed by:

## 2021-10-08 ENCOUNTER — HOSPITAL ENCOUNTER (OUTPATIENT)
Dept: PHYSICAL THERAPY | Age: 66
Setting detail: THERAPIES SERIES
Discharge: HOME | End: 2021-10-08
Payer: COMMERCIAL

## 2021-10-08 PROCEDURE — 97140 MANUAL THERAPY 1/> REGIONS: CPT

## 2021-10-08 PROCEDURE — 97110 THERAPEUTIC EXERCISES: CPT

## 2021-10-08 NOTE — FLOWSHEET NOTE
[address] [place] [address] [facility] [address]  Phone [phone]                                                [x] Daily Treatment Note   [] Progress Note   [] Discharge Note      Date:  10/8/2021    Patient Name:  Filiberto Posada III        :  1955  M75.102 (ICD-10-CM) - Nontraumatic tear of left rotator cuff, unspecified tear extent                                  Medical Diagnosis:                                 ICD 10:       Treatment Diagnosis:  Same. 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     Onset Date:    Around 9/10/21                Referral Date:  21               Referring Physician: Dr. Mario Kessler of care signed (Y/N):      Progress report will be due (10 Rx or 30 days whichever is less):  42/78/10    Recertification will be due (POC Duration  / 90 days whichever is less):  21    Visit# / total visits:   Visit # Insurance Allowable Auth Required   In Person 3  medicare and [employer] []  Yes     []  No    Tele Health 0  []  Yes     []  No    Total 3       Latex Allergy:  [x]NO      []YES    Pain level: 3-4/10 movement      6/10 lifting    Subjective:  10/8/21   10/5/21 reports he is about the same  10/1/21 doing about the same. Painting today, using his R arm overhead. 21 about 2-3 wks ago he wrestled a bull to the ground and then could not move his arm at all.   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        Pertinent radiographs/imaging:  MRI 9/10/2021: Full-thickness retracted supraspinatus and infraspinatus tears to the level of the glenoid.  There is in association with edema and a flattening of the superior humeral head which appears to articulate with the sclerotic acromion.  There is acromioclavicular joint arthrosis as well.  Positive fluid in the biceps tendon sheath.  Positive high-grade tendinosis in the subscapularis without jens tear.  Grade 2/3 changes supraspinatus and infraspinatus.  Positive edema.  Significant/severe glenohumeral arthrosis also appreciated       Diagnosis Orders   1. Nontraumatic tear of left rotator cuff, unspecified tear extent  meloxicam (MOBIC) 15 MG tablet     [facility] [place]   2. Osteoarthritis of glenohumeral joint, left      3. Acute pain of left shoulder                Pain    Patient describes pain to be intermit L shoulder pain with movement. Patient reports  0/10 pain at rest,  3-4/10 pain with movement. 6/10 lifting  Worsened by  - trying to raise his arm forward and evon to the side  Improved by  - he is taking 1 meloxicam at night. He sleeps with meds. 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 unable to tolerate laying on side of pain,  , reaching overhead,              Exercises: dx  L rot cuff tear  Exercise/Equipment Resistance/Repetitions Other comments   9/29/21 explained course and role of PT     scap ex:  center sqz x10    50 a day   incr to 100 x                 Back pocekts \"                                 \" x   Prone/leaning:  ext Blue  3x 10, 3# in dept x                   Short row/ black  3x 10, 5# in dept  x                  horiz abd Green  3x 10, 5# in dept    More difficult x   Supine ceiling punch Black   bilat   3x10, 5# in dept x               Figure 8 black  3x 10   L only, 5# in dept x    10/1/21 SL sh abd 2x10  No wt.   difficult X    altn w/ ER                 SL   ER  W/ towel roll 2x10   No wt. Very small motion X    difficult   10/8/21 standing   ER w/ towel roll  red  2x 10                 stadning IR w/ towl roll Red   2x 10                                  NV: manual jt mobiliz, brief ROM, check HEP, start pulleys if able.                                 Therapeutic Exercise:    13  min   AROM:  150 flx and abd. Not painful. flx to 4 to 4+/5 , not painful ( improvements)    Group Therapy:      Home Exercise Program:      Therapeutic Activity:      Neuromuscular Re-education:      Gait:      Manual Therapy:    25 min   - g-h distrction, glides,   ROM  3x10,       Canalith Repositioning Procedure:       Modalities:CP x declined min to R sh  In supine, sheet toga       Charges:  Timed Code Treatment Minutes:  38   Total Treatment Minutes:   40    BWC time for each procedure?:  TE TIME:  NMR TIME:  MANUAL TIME:  UNTIMED MINUTES:          [] EVAL (LOW) 11541 (typically 20 minutes face-to-face)  [] EVAL (MOD) 11739 (typically 30 minutes face-to-face)  [] EVAL (HIGH) 03894 (typically 45 minutes face-to-face)  [] RE-EVAL     [x] Armenian(99351) x    1  [] IONTO  [] NMR (35079) x     [] VASO  [x] Manual (57419) x  2   [] Other:  [] TA x      [] Mech Traction (73676)  [] ES(attended) (28712)     [] ES (un) (98268): Medicare Cap Total YTD:  396.00    Treatment/Activity Tolerance:    [x] Patient tolerated treatment well [] Patient limited by fatigue   [] Patient limited by pain [] Patient limited by other medical complications   [] Other:     Prognosis: [x] Good [] Fair  [] Poor    Patient Requires Follow-up:  [x] Yes  [] No    Plan: [x] Continue per plan of care [] Alter current plan (see comments)   [] Plan of care initiated [] Hold pending MD visit [] Discharge    Plan for Next Session:  above  See Progress Note: [] Yes  [x] No       Next due:         Electronically signed by:  Franc Mercado PTHuan    Note: If patient does not return for scheduled/ recommended follow up visits, this note will serve as a discharge from care along with most recent update on progress. Outpatient Physical Therapy  Progress Note      Progress Note covers period from:     To       Subjective:           Objective:   Observation:   Test measurements:       Functional Outcome Measure:            Assessment:   Summary:  Patient's response to treatment:      Goals:  · Progress toward previous goals:      Plan:  ·     Electronically Signed by:

## 2021-10-12 ENCOUNTER — HOSPITAL ENCOUNTER (OUTPATIENT)
Dept: PHYSICAL THERAPY | Age: 66
Setting detail: THERAPIES SERIES
Discharge: HOME | End: 2021-10-12
Payer: COMMERCIAL

## 2021-10-12 PROCEDURE — 97140 MANUAL THERAPY 1/> REGIONS: CPT

## 2021-10-12 PROCEDURE — 97110 THERAPEUTIC EXERCISES: CPT

## 2021-10-12 NOTE — FLOWSHEET NOTE
[address] [place] [address] [facility] [address]  Phone [phone]                                                [x] Daily Treatment Note   [] Progress Note   [] Discharge Note      Date:  10/12/2021    Patient Name:  Donna Cooper III        :  1955  M75.102 (ICD-10-CM) - Nontraumatic tear of left rotator cuff, unspecified tear extent                                  Medical Diagnosis:                                 ICD 10:       Treatment Diagnosis:  Same. 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     Onset Date:    Around 9/10/21                Referral Date:  21               Referring Physician: Dr. Melida Wagner of care signed (Y/N):      Progress report will be due (10 Rx or 30 days whichever is less):      Recertification will be due (POC Duration  / 90 days whichever is less):  21    Visit# / total visits:   Visit # Insurance Allowable Auth Required   In Person 4  medicare and Bobby Bartholomew []  Yes     []  No    Tele Health 0  []  Yes     []  No    Total 4       Latex Allergy:  [x]NO      []YES    Pain level: 0-1/10 with laying at rest        3-4/10 movement      6/10 lifting    Subjective: 10/12/21  Reports lifting out to the side is the most bothersome activity  10/8/21   10/5/21 reports he is about the same  10/1/21 doing about the same. Painting today, using his R arm overhead. 21 about 2-3 wks ago he wrestled a bull to the ground and then could not move his arm at all.   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        Pertinent radiographs/imaging:  MRI 9/10/2021: Full-thickness retracted supraspinatus and infraspinatus tears to the level of the glenoid.  There is in association with edema and a flattening of the superior humeral head which appears to articulate with the sclerotic acromion. Mainor Felix is acromioclavicular joint arthrosis as well.  Positive fluid in the biceps tendon sheath.  Positive high-grade tendinosis in the subscapularis without jens tear.  Grade 2/3 changes supraspinatus and infraspinatus.  Positive edema.  Significant/severe glenohumeral arthrosis also appreciated       Diagnosis Orders   1. Nontraumatic tear of left rotator cuff, unspecified tear extent  meloxicam (MOBIC) 15 MG tablet     [facility] [place]   2. Osteoarthritis of glenohumeral joint, left      3. Acute pain of left shoulder                Pain    Patient describes pain to be intermit L shoulder pain with movement. Patient reports  0/10 pain at rest,  3-4/10 pain with movement. 6/10 lifting  Worsened by  - trying to raise his arm forward and evon to the side  Improved by  - he is taking 1 meloxicam at night. He sleeps with meds. 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 unable to tolerate laying on side of pain,  , reaching overhead,              Exercises: dx  L rot cuff tear  Exercise/Equipment Resistance/Repetitions Other comments   9/29/21 explained course and role of PT     scap ex:  center sqz x10    50 a day   incr to 100 x                 Back pocekts \"                                 \" x   Prone/leaning:  ext Blue  3x 10, 5# in dept x                   Short row/ black  3x 10, 5# in dept  x                  horiz abd Green  3x 10, 3# in dept    More difficult x   Supine ceiling punch Black   bilat   3x10, 5# in dept x               Figure 8 black  3x 10   L only, 5# in dept x    10/1/21 SL sh abd 2x10  No wt.   difficult X    altn w/ ER                 SL   ER  W/ towel roll 2x10   No wt.   Very small motion X    difficult   10/8/21 standing   ER w/ towel roll  red  2x 10                 standing IR w/ towl roll Red   2x 10         PULLEY nv since he did well with band ex ER challenging Functional Outcome Measure:            Assessment:   Summary:    Patient's response to treatment:      Goals:  · Progress toward previous goals:      Plan:  ·     Electronically Signed by:

## 2021-10-14 ENCOUNTER — HOSPITAL ENCOUNTER (OUTPATIENT)
Dept: PHYSICAL THERAPY | Age: 66
Setting detail: THERAPIES SERIES
Discharge: HOME | End: 2021-10-14
Payer: COMMERCIAL

## 2021-10-14 PROCEDURE — 97140 MANUAL THERAPY 1/> REGIONS: CPT

## 2021-10-14 PROCEDURE — 97110 THERAPEUTIC EXERCISES: CPT

## 2021-10-14 NOTE — FLOWSHEET NOTE
[address] [place] [address] [facility] [address]  Phone [phone]                                                [x] Daily Treatment Note   [] Progress Note   [] Discharge Note      Date:  10/14/2021    Patient Name:  Areli Clark III        :  1955  M75.102 (ICD-10-CM) - Nontraumatic tear of left rotator cuff, unspecified tear extent                                  Medical Diagnosis:                                 ICD 10:       Treatment Diagnosis:  Same. 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     Onset Date:    Around 9/10/21                Referral Date:  21               Referring Physician: Dr. Ace Ramos of care signed (Y/N):      Progress report will be due (10 Rx or 30 days whichever is less):      Recertification will be due (POC Duration  / 90 days whichever is less):  21    Visit# / total visits:   Visit # Insurance Allowable Auth Required   In Person 5  medicare and Bambi Miller []  Yes     [x]  No    Tele Health 0  []  Yes     []  No    Total 5       Latex Allergy:  [x]NO      []YES    Pain level: 0-1/10 with laying at rest        3/10 movement      6/10 lifting    Subjective: 10/14/21  Pt reports improvement in pain  10/12/21  Reports lifting out to the side is the most bothersome activity  10/8/21   10/5/21 reports he is about the same  10/1/21 doing about the same. Painting today, using his R arm overhead. 21 about 2-3 wks ago he wrestled a bull to the ground and then could not move his arm at all.   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        Pertinent radiographs/imaging:  MRI 9/10/2021: Full-thickness retracted supraspinatus and infraspinatus tears to the level of the glenoid.  There is in association with edema and a flattening of the superior humeral head which appears to articulate with the sclerotic acromion.  There is acromioclavicular joint arthrosis as well.  Positive fluid in the biceps tendon sheath.  Positive high-grade tendinosis in the subscapularis without jens tear.  Grade 2/3 changes supraspinatus and infraspinatus.  Positive edema.  Significant/severe glenohumeral arthrosis also appreciated       Diagnosis Orders   1. Nontraumatic tear of left rotator cuff, unspecified tear extent  meloxicam (MOBIC) 15 MG tablet     [facility]   2. Osteoarthritis of glenohumeral joint, left      3. Acute pain of left shoulder                Pain    Patient describes pain to be intermit L shoulder pain with movement. Patient reports  0/10 pain at rest,  3-4/10 pain with movement. 6/10 lifting  Worsened by  - trying to raise his arm forward and evon to the side  Improved by  - he is taking 1 meloxicam at night. He sleeps with meds. 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 unable to tolerate laying on side of pain,  , reaching overhead,              Exercises: dx  L rot cuff tear  Exercise/Equipment Resistance/Repetitions Other comments   9/29/21 explained course and role of PT     scap ex:  center sqz x10    50 a day   incr to 100 x                 Back pocekts \"                                 \" x   Prone/leaning:  ext Blue  3x 10, 5# in dept x                   Short row/ black  3x 10, 5# in dept  x                  horiz abd Green  3x 10, 3# in dept    More difficult x   Supine ceiling punch Black   bilat   3x10, 5# in dept x               Figure 8 black  3x 10   L only, 5# in dept x    10/1/21 SL sh abd 2x10  No wt.   difficult X    altn w/ ER                 SL   ER  W/ towel roll 2x10   No wt.   Very small motion X    difficult   10/8/21 standing   ER w/ towel roll  red  2x 10  x               standing IR w/ towl roll Red   2x 10 x        PULLEY     -IR bench 10# 3x10    -ER bench 12.5# 3x10    -mid row 22.5# 1x10, 25# 2x10    -low row  10# 3x10    -add 10# 3x10                               Therapeutic Exercise:    23  min      Group Therapy:      Home Exercise Program:      Therapeutic Activity:      Neuromuscular Re-education:      Gait:      Manual Therapy:    15 min   - g-h distrction, glides,   ROM  3x10,       Canalith Repositioning Procedure:       Modalities:CP x declined min to R sh  In supine, sheet toga       Charges:  Timed Code Treatment Minutes:  38   Total Treatment Minutes:   38    BWC time for each procedure?:  TE TIME:  NMR TIME:  MANUAL TIME:  UNTIMED MINUTES:          [] EVAL (LOW) 93221 (typically 20 minutes face-to-face)  [] EVAL (MOD) 80973 (typically 30 minutes face-to-face)  [] EVAL (HIGH) 81050 (typically 45 minutes face-to-face)  [] RE-EVAL     [x] DV(77239) x    2  [] IONTO  [] NMR (22556) x     [] VASO  [x] Manual (57997) x  1   [] Other:  [] TA x      [] Mech Traction (26315)  [] ES(attended) (18776)     [] ES (un) (11359): Medicare Cap Total YTD:  537.00    Treatment/Activity Tolerance:    [x] Patient tolerated treatment well [] Patient limited by fatigue   [] Patient limited by pain [] Patient limited by other medical complications   [] Other:     Prognosis: [x] Good [] Fair  [] Poor    Patient Requires Follow-up:  [x] Yes  [] No    Plan: [x] Continue per plan of care [] Alter current plan (see comments)   [] Plan of care initiated [] Hold pending MD visit [] Discharge    Plan for Next Session:  above  See Progress Note: [] Yes  [x] No       Next due:         Electronically signed by:  Willa Reilly,   [id number]    Note: If patient does not return for scheduled/ recommended follow up visits, this note will serve as a discharge from care along with most recent update on progress. Outpatient Physical Therapy  Progress Note      Progress Note covers period from:     To       Subjective:           Objective:   Observation:   Test measurements:       Functional Outcome Measure:            Assessment:   Summary:    Patient's response to treatment:      Goals:  · Progress toward previous goals:      Plan:  ·     Electronically Signed by:

## 2021-12-16 ENCOUNTER — HOSPITAL ENCOUNTER (OUTPATIENT)
Dept: CT IMAGING | Age: 66
Discharge: HOME | End: 2021-12-16
Payer: COMMERCIAL

## 2021-12-16 ENCOUNTER — HOSPITAL ENCOUNTER (OUTPATIENT)
Age: 66
Discharge: HOME | End: 2021-12-16
Payer: COMMERCIAL

## 2021-12-16 DIAGNOSIS — R91.1 SOLITARY LUNG NODULE: ICD-10-CM

## 2021-12-16 LAB
BUN BLDV-MCNC: 30 MG/DL (ref 7–20)
CREAT SERPL-MCNC: 1 MG/DL (ref 0.8–1.3)
GFR AFRICAN AMERICAN: >60
GFR NON-AFRICAN AMERICAN: >60

## 2021-12-16 PROCEDURE — 82565 ASSAY OF CREATININE: CPT

## 2021-12-16 PROCEDURE — 71260 CT THORAX DX C+: CPT

## 2021-12-16 PROCEDURE — 6360000004 HC RX CONTRAST MEDICATION: Performed by: INTERNAL MEDICINE

## 2021-12-16 PROCEDURE — 84520 ASSAY OF UREA NITROGEN: CPT

## 2021-12-16 PROCEDURE — 36415 COLL VENOUS BLD VENIPUNCTURE: CPT

## 2021-12-16 RX ADMIN — IOPAMIDOL 75 ML: 755 INJECTION, SOLUTION INTRAVENOUS at 10:28

## 2022-06-22 ENCOUNTER — TELEMEDICINE (OUTPATIENT)
Dept: FAMILY MEDICINE CLINIC | Age: 67
End: 2022-06-22
Payer: COMMERCIAL

## 2022-06-22 DIAGNOSIS — L03.119 CELLULITIS OF LOWER EXTREMITY, UNSPECIFIED LATERALITY: Primary | ICD-10-CM

## 2022-06-22 PROCEDURE — 99213 OFFICE O/P EST LOW 20 MIN: CPT | Performed by: NURSE PRACTITIONER

## 2022-06-22 PROCEDURE — 1123F ACP DISCUSS/DSCN MKR DOCD: CPT | Performed by: NURSE PRACTITIONER

## 2022-06-22 RX ORDER — ROSUVASTATIN CALCIUM 40 MG/1
40 TABLET, COATED ORAL EVERY EVENING
COMMUNITY

## 2022-06-22 RX ORDER — SULFAMETHOXAZOLE AND TRIMETHOPRIM 800; 160 MG/1; MG/1
1 TABLET ORAL 2 TIMES DAILY
Qty: 20 TABLET | Refills: 0 | Status: SHIPPED | OUTPATIENT
Start: 2022-06-22 | End: 2022-07-02

## 2022-06-22 ASSESSMENT — PATIENT HEALTH QUESTIONNAIRE - PHQ9
2. FEELING DOWN, DEPRESSED OR HOPELESS: 0
SUM OF ALL RESPONSES TO PHQ QUESTIONS 1-9: 0
SUM OF ALL RESPONSES TO PHQ9 QUESTIONS 1 & 2: 0
SUM OF ALL RESPONSES TO PHQ QUESTIONS 1-9: 0
SUM OF ALL RESPONSES TO PHQ QUESTIONS 1-9: 0
1. LITTLE INTEREST OR PLEASURE IN DOING THINGS: 0
SUM OF ALL RESPONSES TO PHQ QUESTIONS 1-9: 0

## 2022-06-22 ASSESSMENT — ENCOUNTER SYMPTOMS
GASTROINTESTINAL NEGATIVE: 1
RESPIRATORY NEGATIVE: 1
COLOR CHANGE: 1

## 2022-06-22 NOTE — PROGRESS NOTES
2022    TELEHEALTH EVALUATION -- Audio/Visual (During QTVEY-24 public health emergency)    HPI:    Nel Tate III (:  1955) has requested an audio/video evaluation for the following concern(s):    Patient presents via video call with concerns of a red spot on the side of his leg. He states his wife noticed it yesterday when he was outside mowing the grass. He states it is tender if touched, but denies any pain otherwise. He reports maybe trace swelling. He denies any history of any DVT. Review of Systems   Constitutional: Negative. HENT: Negative. Respiratory: Negative. Cardiovascular: Negative. Gastrointestinal: Negative. Genitourinary: Negative. Musculoskeletal: Negative. Skin: Positive for color change and wound. Neurological: Negative. Prior to Visit Medications    Medication Sig Taking? Authorizing Provider   rosuvastatin (CRESTOR) 40 MG tablet Take 40 mg by mouth every evening Yes Historical Provider, MD   sulfamethoxazole-trimethoprim (BACTRIM DS;SEPTRA DS) 800-160 MG per tablet Take 1 tablet by mouth 2 times daily for 10 days Yes ALBINA Martinez - CNP   lisinopril (PRINIVIL;ZESTRIL) 20 MG tablet Take 5 mg by mouth daily  Yes Historical Provider, MD   aspirin EC 81 MG EC tablet Take 1 tablet by mouth daily. Yes Yudith Yates MD       Social History     Tobacco Use    Smoking status: Never Smoker    Smokeless tobacco: Never Used   Vaping Use    Vaping Use: Never used   Substance Use Topics    Alcohol use: Yes     Alcohol/week: 2.0 standard drinks     Types: 2 Cans of beer per week    Drug use:  No            PHYSICAL EXAMINATION:  [ INSTRUCTIONS:  \"[x]\" Indicates a positive item  \"[]\" Indicates a negative item  -- DELETE ALL ITEMS NOT EXAMINED]  Vital Signs: (As obtained by patient/caregiver or practitioner observation)    Blood pressure-  Heart rate-    Respiratory rate-    Temperature-  Pulse oximetry-     Constitutional: [x] Appears well-developed and well-nourished [] No apparent distress      [] Abnormal-   Mental status  [x] Alert and awake  [x] Oriented to person/place/time []Able to follow commands      Eyes:  EOM    []  Normal  [] Abnormal-  Sclera  []  Normal  [] Abnormal -         Discharge []  None visible  [] Abnormal -    HENT:   [x] Normocephalic, atraumatic. [] Abnormal   [x] Mouth/Throat: Mucous membranes are moist.     External Ears [] Normal  [] Abnormal-     Neck: [] No visualized mass     Pulmonary/Chest: [x] Respiratory effort normal.  [x] No visualized signs of difficulty breathing or respiratory distress        [] Abnormal-      Musculoskeletal:   [] Normal gait with no signs of ataxia         [x] Normal range of motion of neck        [] Abnormal-       Neurological:        [] No Facial Asymmetry (Cranial nerve 7 motor function) (limited exam to video visit)          [] No gaze palsy        [] Abnormal-         Skin:        [x] No significant exanthematous lesions or discoloration noted on facial skin         [] Abnormal-            Psychiatric:       [] Normal Affect [] No Hallucinations        [] Abnormal-     Other pertinent observable physical exam findings-     ASSESSMENT/PLAN:  1. Cellulitis of lower extremity, unspecified laterality  Will start on Bactrim and follow up if no improvement. - sulfamethoxazole-trimethoprim (BACTRIM DS;SEPTRA DS) 800-160 MG per tablet; Take 1 tablet by mouth 2 times daily for 10 days  Dispense: 20 tablet; Refill: 0          Consuelo Tiwari III is a 73 y.o. male being evaluated by a Virtual Visit (video visit) encounter to address concerns as mentioned above. A caregiver was present when appropriate. Due to this being a TeleHealth encounter (During [facility]- public health emergency), evaluation of the following organ systems was limited: Vitals/Constitutional/EENT/Resp/CV/GI//MS/Neuro/Skin/Heme-Lymph-Imm.   Pursuant to the emergency declaration under the [address] Emergencies Act, 1135 waiver authority and the Coronavirus Preparedness and Response Supplemental Appropriations Act, this Virtual Visit was conducted with patient's (and/or legal guardian's) consent, to reduce the patient's risk of exposure to COVID-19 and provide necessary medical care. The patient (and/or legal guardian) has also been advised to contact this office for worsening conditions or problems, and seek emergency medical treatment and/or call 911 if deemed necessary. Services were provided through a video synchronous discussion virtually to substitute for in-person clinic visit. Patient and provider were located at their individual homes. --ALBINA Bergeron - CNP on 6/22/2022 at 8:41 AM    This chart was generated using the [address] [place] [address] dictation system. I created this record but it may contain dictation errors due to the limitation of the software. An electronic signature was used to authenticate this note.

## 2022-09-29 ENCOUNTER — OFFICE VISIT (OUTPATIENT)
Dept: FAMILY MEDICINE CLINIC | Age: 67
End: 2022-09-29
Payer: COMMERCIAL

## 2022-09-29 VITALS
HEIGHT: 70 IN | SYSTOLIC BLOOD PRESSURE: 114 MMHG | HEART RATE: 59 BPM | DIASTOLIC BLOOD PRESSURE: 80 MMHG | WEIGHT: 177 LBS | BODY MASS INDEX: 25.34 KG/M2 | OXYGEN SATURATION: 97 %

## 2022-09-29 DIAGNOSIS — Z12.5 SCREENING FOR MALIGNANT NEOPLASM OF PROSTATE: ICD-10-CM

## 2022-09-29 DIAGNOSIS — I25.10 CORONARY ARTERY DISEASE INVOLVING NATIVE HEART WITHOUT ANGINA PECTORIS, UNSPECIFIED VESSEL OR LESION TYPE: ICD-10-CM

## 2022-09-29 DIAGNOSIS — Z13.1 SCREENING FOR DIABETES MELLITUS (DM): ICD-10-CM

## 2022-09-29 DIAGNOSIS — Z00.00 WELL ADULT EXAM: Primary | ICD-10-CM

## 2022-09-29 DIAGNOSIS — Z00.00 WELL ADULT EXAM: ICD-10-CM

## 2022-09-29 LAB
A/G RATIO: 1.7 (ref 1.1–2.2)
ALBUMIN SERPL-MCNC: 4.4 G/DL (ref 3.4–5)
ALP BLD-CCNC: 160 U/L (ref 40–129)
ALT SERPL-CCNC: 43 U/L (ref 10–40)
ANION GAP SERPL CALCULATED.3IONS-SCNC: 7 MMOL/L (ref 3–16)
AST SERPL-CCNC: 33 U/L (ref 15–37)
BILIRUB SERPL-MCNC: 0.3 MG/DL (ref 0–1)
BUN BLDV-MCNC: 17 MG/DL (ref 7–20)
CALCIUM SERPL-MCNC: 9.6 MG/DL (ref 8.3–10.6)
CHLORIDE BLD-SCNC: 105 MMOL/L (ref 99–110)
CHOLESTEROL, FASTING: 113 MG/DL (ref 0–199)
CO2: 28 MMOL/L (ref 21–32)
CREAT SERPL-MCNC: 0.9 MG/DL (ref 0.8–1.3)
GFR AFRICAN AMERICAN: >60
GFR NON-AFRICAN AMERICAN: >60
GLUCOSE BLD-MCNC: 91 MG/DL (ref 70–99)
HDLC SERPL-MCNC: 38 MG/DL (ref 40–60)
LDL CHOLESTEROL CALCULATED: 62 MG/DL
POTASSIUM SERPL-SCNC: 5.8 MMOL/L (ref 3.5–5.1)
PROSTATE SPECIFIC ANTIGEN: 5.68 NG/ML (ref 0–4)
SODIUM BLD-SCNC: 140 MMOL/L (ref 136–145)
TOTAL PROTEIN: 7 G/DL (ref 6.4–8.2)
TRIGLYCERIDE, FASTING: 63 MG/DL (ref 0–150)
VLDLC SERPL CALC-MCNC: 13 MG/DL

## 2022-09-29 PROCEDURE — 99397 PER PM REEVAL EST PAT 65+ YR: CPT | Performed by: FAMILY MEDICINE

## 2022-09-29 RX ORDER — LISINOPRIL 5 MG/1
5 TABLET ORAL DAILY
COMMUNITY

## 2022-09-29 ASSESSMENT — PATIENT HEALTH QUESTIONNAIRE - PHQ9
2. FEELING DOWN, DEPRESSED OR HOPELESS: 0
SUM OF ALL RESPONSES TO PHQ QUESTIONS 1-9: 0
SUM OF ALL RESPONSES TO PHQ9 QUESTIONS 1 & 2: 0
1. LITTLE INTEREST OR PLEASURE IN DOING THINGS: 0

## 2022-09-29 NOTE — PROGRESS NOTES
Zo Browne (:  1955) is a 78 y.o. male,Established patient, here for evaluation of the following chief complaint(s): Annual Exam         ASSESSMENT/PLAN:  1. Well adult exam  -     Comprehensive Metabolic Panel; Future  2. Coronary artery disease involving native heart without angina pectoris, unspecified vessel or lesion type  -     Lipid, Fasting; Future  3. Screening for malignant neoplasm of prostate  -     PSA Screening; Future  4. Screening for diabetes mellitus (DM)  -     Hemoglobin A1C; Future    No follow-ups on file. Subjective   SUBJECTIVE/OBJECTIVE:  Zo Browne is a 78 y.o. male. Patient presents with: Annual Exam      81-year-old male presents for annual exam.  No acute complaints today. Has been otherwise doing well. The patients PMH, surgical history, family history, medications, allergies were all reviewed and updated as appropriate today. Review of Systems   Constitutional:  Negative for fatigue and unexpected weight change. HENT:  Negative for congestion, rhinorrhea and trouble swallowing. Eyes:  Negative for pain and visual disturbance. Respiratory:  Negative for chest tightness and shortness of breath. Cardiovascular:  Negative for chest pain and palpitations. Gastrointestinal:  Negative for constipation and diarrhea. Endocrine: Negative for cold intolerance and heat intolerance. Genitourinary:  Negative for frequency and urgency. Musculoskeletal:  Negative for arthralgias and back pain. Skin:  Negative for color change and rash. Allergic/Immunologic: Negative for environmental allergies and food allergies. Neurological:  Negative for dizziness and light-headedness. Hematological:  Negative for adenopathy. Does not bruise/bleed easily. Psychiatric/Behavioral:  Negative for dysphoric mood and sleep disturbance. Objective   Physical Exam  Vitals and nursing note reviewed.    Constitutional:       Appearance: Normal appearance. He is well-developed. HENT:      Head: Normocephalic and atraumatic. Right Ear: External ear normal.      Left Ear: External ear normal.      Nose: Nose normal.   Eyes:      Conjunctiva/sclera: Conjunctivae normal.      Pupils: Pupils are equal, round, and reactive to light. Cardiovascular:      Rate and Rhythm: Normal rate and regular rhythm. Heart sounds: Normal heart sounds. No murmur heard. No friction rub. No gallop. Pulmonary:      Effort: Pulmonary effort is normal. No respiratory distress. Breath sounds: Normal breath sounds. No wheezing. Abdominal:      General: Bowel sounds are normal. There is no distension. Palpations: Abdomen is soft. Tenderness: There is no abdominal tenderness. Musculoskeletal:         General: No tenderness. Normal range of motion. Cervical back: Normal range of motion and neck supple. Skin:     General: Skin is warm and dry. Neurological:      Mental Status: He is alert and oriented to person, place, and time. Deep Tendon Reflexes: Reflexes are normal and symmetric. Psychiatric:         Behavior: Behavior normal.         Thought Content: Thought content normal.         Judgment: Judgment normal.            An electronic signature was used to authenticate this note.     --Nilesh Birmingham MD

## 2022-09-30 LAB
ESTIMATED AVERAGE GLUCOSE: 111.2 MG/DL
HBA1C MFR BLD: 5.5 %

## 2022-10-09 ASSESSMENT — ENCOUNTER SYMPTOMS
CONSTIPATION: 0
DIARRHEA: 0
BACK PAIN: 0
COLOR CHANGE: 0
TROUBLE SWALLOWING: 0
EYE PAIN: 0
CHEST TIGHTNESS: 0
SHORTNESS OF BREATH: 0
RHINORRHEA: 0

## 2023-01-05 ENCOUNTER — HOSPITAL ENCOUNTER (OUTPATIENT)
Dept: CT IMAGING | Age: 68
Discharge: HOME | End: 2023-01-05
Payer: MEDICARE

## 2023-01-05 DIAGNOSIS — R91.1 LUNG NODULE: ICD-10-CM

## 2023-01-05 PROCEDURE — 6360000004 HC RX CONTRAST MEDICATION: Performed by: INTERNAL MEDICINE

## 2023-01-05 PROCEDURE — 71260 CT THORAX DX C+: CPT

## 2023-01-05 RX ADMIN — IOPAMIDOL 75 ML: 755 INJECTION, SOLUTION INTRAVENOUS at 10:07

## 2023-06-07 ENCOUNTER — COMMUNITY OUTREACH (OUTPATIENT)
Dept: FAMILY MEDICINE CLINIC | Age: 68
End: 2023-06-07

## 2024-05-23 ENCOUNTER — HOSPITAL ENCOUNTER (OUTPATIENT)
Age: 69
Discharge: HOME | End: 2024-05-23
Payer: MEDICARE

## 2024-05-23 LAB
CHOLEST SERPL-MCNC: 118 MG/DL (ref 0–199)
HDLC SERPL-MCNC: 37 MG/DL (ref 40–60)
INR PPP: 0.96 (ref 0.85–1.15)
LDLC SERPL CALC-MCNC: 62 MG/DL
PROTHROMBIN TIME: 13 SEC (ref 11.9–14.9)
TRIGL SERPL-MCNC: 97 MG/DL (ref 0–150)
VLDLC SERPL CALC-MCNC: 19 MG/DL

## 2024-05-23 PROCEDURE — 85610 PROTHROMBIN TIME: CPT

## 2024-05-23 PROCEDURE — 80061 LIPID PANEL: CPT

## 2024-05-23 PROCEDURE — 80048 BASIC METABOLIC PNL TOTAL CA: CPT

## 2024-05-24 ENCOUNTER — HOSPITAL ENCOUNTER (OUTPATIENT)
Age: 69
Discharge: HOME | End: 2024-05-24
Payer: MEDICARE

## 2024-05-24 LAB
ANION GAP SERPL CALCULATED.3IONS-SCNC: 11 MMOL/L (ref 3–16)
BUN SERPL-MCNC: 20 MG/DL (ref 7–20)
CALCIUM SERPL-MCNC: 8.9 MG/DL (ref 8.3–10.6)
CHLORIDE SERPL-SCNC: 104 MMOL/L (ref 99–110)
CO2 SERPL-SCNC: 23 MMOL/L (ref 21–32)
CREAT SERPL-MCNC: 0.8 MG/DL (ref 0.8–1.3)
DEPRECATED RDW RBC AUTO: 13.2 % (ref 12.4–15.4)
GFR SERPLBLD CREATININE-BSD FMLA CKD-EPI: >90 ML/MIN/{1.73_M2}
GLUCOSE SERPL-MCNC: 85 MG/DL (ref 70–99)
HCT VFR BLD AUTO: 43.6 % (ref 40.5–52.5)
HGB BLD-MCNC: 14.9 G/DL (ref 13.5–17.5)
MCH RBC QN AUTO: 31.2 PG (ref 26–34)
MCHC RBC AUTO-ENTMCNC: 34.2 G/DL (ref 31–36)
MCV RBC AUTO: 91.1 FL (ref 80–100)
PLATELET # BLD AUTO: 217 K/UL (ref 135–450)
PMV BLD AUTO: 7.5 FL (ref 5–10.5)
POTASSIUM SERPL-SCNC: 4.9 MMOL/L (ref 3.5–5.1)
RBC # BLD AUTO: 4.79 M/UL (ref 4.2–5.9)
SODIUM SERPL-SCNC: 138 MMOL/L (ref 136–145)
WBC # BLD AUTO: 5.6 K/UL (ref 4–11)

## 2024-05-24 PROCEDURE — 85027 COMPLETE CBC AUTOMATED: CPT

## 2024-05-24 PROCEDURE — 36415 COLL VENOUS BLD VENIPUNCTURE: CPT

## 2025-03-05 ENCOUNTER — OFFICE VISIT (OUTPATIENT)
Dept: FAMILY MEDICINE CLINIC | Age: 70
End: 2025-03-05
Payer: COMMERCIAL

## 2025-03-05 VITALS
HEART RATE: 59 BPM | OXYGEN SATURATION: 95 % | HEIGHT: 70 IN | DIASTOLIC BLOOD PRESSURE: 68 MMHG | BODY MASS INDEX: 27.77 KG/M2 | WEIGHT: 194 LBS | SYSTOLIC BLOOD PRESSURE: 92 MMHG

## 2025-03-05 DIAGNOSIS — R39.12 BENIGN PROSTATIC HYPERPLASIA WITH WEAK URINARY STREAM: ICD-10-CM

## 2025-03-05 DIAGNOSIS — N40.1 BENIGN PROSTATIC HYPERPLASIA WITH WEAK URINARY STREAM: ICD-10-CM

## 2025-03-05 DIAGNOSIS — S16.1XXA STRAIN OF NECK MUSCLE, INITIAL ENCOUNTER: ICD-10-CM

## 2025-03-05 DIAGNOSIS — M75.01 ADHESIVE CAPSULITIS OF BOTH SHOULDERS: ICD-10-CM

## 2025-03-05 DIAGNOSIS — E78.5 HYPERLIPIDEMIA, UNSPECIFIED HYPERLIPIDEMIA TYPE: ICD-10-CM

## 2025-03-05 DIAGNOSIS — I25.10 CORONARY ARTERY DISEASE INVOLVING NATIVE HEART WITHOUT ANGINA PECTORIS, UNSPECIFIED VESSEL OR LESION TYPE: Primary | ICD-10-CM

## 2025-03-05 DIAGNOSIS — N48.6 PEYRONIE DISEASE: ICD-10-CM

## 2025-03-05 DIAGNOSIS — M75.02 ADHESIVE CAPSULITIS OF BOTH SHOULDERS: ICD-10-CM

## 2025-03-05 DIAGNOSIS — R97.20 ELEVATED PSA: ICD-10-CM

## 2025-03-05 PROCEDURE — 1159F MED LIST DOCD IN RCRD: CPT | Performed by: FAMILY MEDICINE

## 2025-03-05 PROCEDURE — 1123F ACP DISCUSS/DSCN MKR DOCD: CPT | Performed by: FAMILY MEDICINE

## 2025-03-05 PROCEDURE — 99214 OFFICE O/P EST MOD 30 MIN: CPT | Performed by: FAMILY MEDICINE

## 2025-03-05 PROCEDURE — 1160F RVW MEDS BY RX/DR IN RCRD: CPT | Performed by: FAMILY MEDICINE

## 2025-03-05 RX ORDER — CLOPIDOGREL BISULFATE 75 MG/1
75 TABLET ORAL DAILY
COMMUNITY
Start: 2024-03-14

## 2025-03-05 RX ORDER — TADALAFIL 5 MG/1
5 TABLET ORAL PRN
COMMUNITY

## 2025-03-05 SDOH — ECONOMIC STABILITY: FOOD INSECURITY: WITHIN THE PAST 12 MONTHS, YOU WORRIED THAT YOUR FOOD WOULD RUN OUT BEFORE YOU GOT MONEY TO BUY MORE.: NEVER TRUE

## 2025-03-05 SDOH — ECONOMIC STABILITY: FOOD INSECURITY: WITHIN THE PAST 12 MONTHS, THE FOOD YOU BOUGHT JUST DIDN'T LAST AND YOU DIDN'T HAVE MONEY TO GET MORE.: NEVER TRUE

## 2025-03-05 ASSESSMENT — PATIENT HEALTH QUESTIONNAIRE - PHQ9
SUM OF ALL RESPONSES TO PHQ QUESTIONS 1-9: 0
SUM OF ALL RESPONSES TO PHQ QUESTIONS 1-9: 0
2. FEELING DOWN, DEPRESSED OR HOPELESS: NOT AT ALL
1. LITTLE INTEREST OR PLEASURE IN DOING THINGS: NOT AT ALL
SUM OF ALL RESPONSES TO PHQ QUESTIONS 1-9: 0
SUM OF ALL RESPONSES TO PHQ QUESTIONS 1-9: 0

## 2025-03-05 ASSESSMENT — ENCOUNTER SYMPTOMS
VOMITING: 0
SHORTNESS OF BREATH: 0
DIARRHEA: 0
NAUSEA: 0
CONSTIPATION: 0

## 2025-03-05 NOTE — ASSESSMENT & PLAN NOTE
Stable on aspirin Plavix lisinopril rosuvastatin.  Patient is not on a beta-blocker however given his low blood pressure this would likely not be tolerated.  Following up with cardiology

## 2025-03-05 NOTE — PROGRESS NOTES
Respiratory:  Negative for shortness of breath.    Cardiovascular:  Negative for chest pain.   Gastrointestinal:  Negative for constipation, diarrhea, nausea and vomiting.   Genitourinary:  Negative for dysuria.   Musculoskeletal:  Positive for myalgias and neck stiffness.   Skin:  Negative for rash.   Neurological:  Negative for headaches.          Objective   Physical Exam  HENT:      Head: Normocephalic and atraumatic.   Eyes:      Extraocular Movements: Extraocular movements intact.   Cardiovascular:      Rate and Rhythm: Normal rate and regular rhythm.   Pulmonary:      Effort: Pulmonary effort is normal.      Breath sounds: No wheezing, rhonchi or rales.   Musculoskeletal:      Cervical back: Neck supple.      Comments: Neck stiffness with reversed curvature.    Shoulder exam reveals pain with empty can but not breakaway weakness bilaterally.  He has restricted internal and external rotation and has difficulty fully abducting the shoulders laterally.   Neurological:      Mental Status: He is alert and oriented to person, place, and time.                This dictation was generated by a voice recognition computer software.  Although all attempts are made to edit the dictation for accuracy, there may be errors in the transcription that are not intended.    An electronic signature was used to authenticate this note.    --Jhonny Mary MD

## (undated) DEVICE — COLUMN DRAPE

## (undated) DEVICE — SUTURE STRATAFIX SPRL PDS + SZ 2 0 L6IN ABSRB VLT CT 2 L26MM SXPP1B413

## (undated) DEVICE — SUTURE STRATAFIX SPRL SZ 2 0 L5IN ABSRB VLT SH L26MM 1 2 CIR SXPD2B414

## (undated) DEVICE — SUTURE VCRL + SZ 3-0 L18IN ABSRB UD SH 1/2 CIR TAPERCUT NDL VCP864D

## (undated) DEVICE — LARGE NEEDLE DRIVER: Brand: ENDOWRIST

## (undated) DEVICE — ELECTRODE ECG MONITR FOAM TEAR DROP ADLT RED

## (undated) DEVICE — SCISSORS SURG DIA8MM MPLR CRV ENDOWRIST

## (undated) DEVICE — MEGA SUTURECUT ND: Brand: ENDOWRIST

## (undated) DEVICE — TIP COVER ACCESSORY

## (undated) DEVICE — ARM DRAPE

## (undated) DEVICE — SUTURE VCRL + SZ 2-0 L27IN ABSRB VLT SH 1/2 CIR TAPERPOINT VCP317H

## (undated) DEVICE — SOLUTION IV IRRIG WATER 1000ML POUR BRL 2F7114

## (undated) DEVICE — FORCEPS BX L240CM DIA2.4MM L NDL RAD JAW 4 133340

## (undated) DEVICE — SUTURE ETHBND EXCEL SZ 0 L18IN NONABSORBABLE GRN L22MM MO-7 CX41D

## (undated) DEVICE — CONMED SCOPE SAVER BITE BLOCK, 20X27 MM: Brand: SCOPE SAVER

## (undated) DEVICE — GLOVE,SURG,SENSICARE SLT,LF,PF,7.5: Brand: MEDLINE

## (undated) DEVICE — TUBING FLTR PLUME AWAY EVAC W/ SUCT DEV DISP PUREVIEW

## (undated) DEVICE — BLADELESS OBTURATOR: Brand: WECK VISTA

## (undated) DEVICE — ENDO CARRY-ON PROCEDURE KIT INCLUDES SUCTION TUBING, LUBRICANT, GAUZE, BIOHAZARD STICKER, TRANSPORT PAD AND INTERCEPT BEDSIDE KIT.: Brand: ENDO CARRY-ON PROCEDURE KIT

## (undated) DEVICE — TROCAR ENDOSCP L100MM DIA12MM BLNT STBL SL DISP ENDOPATH

## (undated) DEVICE — CANNULA SEAL

## (undated) DEVICE — FENESTRATED BIPOLAR FORCEPS: Brand: ENDOWRIST

## (undated) DEVICE — Device